# Patient Record
Sex: MALE | Race: WHITE | NOT HISPANIC OR LATINO | Employment: FULL TIME | URBAN - METROPOLITAN AREA
[De-identification: names, ages, dates, MRNs, and addresses within clinical notes are randomized per-mention and may not be internally consistent; named-entity substitution may affect disease eponyms.]

---

## 2023-06-27 LAB
CELLS COUNTED TOTAL (#) IN BODY FLUID: 100
CLARITY FLUID: NORMAL
COLOR OF BODY FLUID: NORMAL
ERYTHROCYTES (/UL) IN BODY FLUID: NORMAL /UL
LEUKOCYTES (/UL) IN BODY FLUID: 930 /UL
LYMPHOCYTES/100 LEUKOCYTES IN BODY FLUID BY MAN CT: 80 %
MONOCYTES+MACROPHAGES/100 WBC IN BODY FLUID BY MAN CT: 4 %
NEUTROPHILS/100 LEUKOCYTES IN BODY FLUID BY MANUAL COUNT: 16 %

## 2023-06-28 LAB — JOINT FLUID CRYSTALS: NORMAL

## 2023-07-01 LAB
GRAM STAIN: NORMAL
STERILE FLUID CULTURE/SMEAR: NORMAL

## 2023-07-05 ENCOUNTER — APPOINTMENT (OUTPATIENT)
Dept: PRIMARY CARE | Facility: CLINIC | Age: 63
End: 2023-07-05
Payer: COMMERCIAL

## 2023-08-15 ENCOUNTER — APPOINTMENT (OUTPATIENT)
Dept: PRIMARY CARE | Facility: CLINIC | Age: 63
End: 2023-08-15
Payer: COMMERCIAL

## 2023-09-16 ASSESSMENT — PROMIS GLOBAL HEALTH SCALE
RATE_PHYSICAL_HEALTH: GOOD
RATE_GENERAL_HEALTH: GOOD
RATE_AVERAGE_PAIN: 5
RATE_QUALITY_OF_LIFE: GOOD
CARRYOUT_SOCIAL_ACTIVITIES: VERY GOOD
RATE_MENTAL_HEALTH: GOOD
EMOTIONAL_PROBLEMS: NEVER
CARRYOUT_PHYSICAL_ACTIVITIES: COMPLETELY
RATE_SOCIAL_SATISFACTION: GOOD

## 2023-09-19 ENCOUNTER — HOSPITAL ENCOUNTER (OUTPATIENT)
Dept: DATA CONVERSION | Facility: HOSPITAL | Age: 63
Discharge: HOME | End: 2023-09-19
Payer: COMMERCIAL

## 2023-09-19 DIAGNOSIS — T84.84XA PAIN DUE TO INTERNAL ORTHOPEDIC PROSTHETIC DEVICES, IMPLANTS AND GRAFTS, INITIAL ENCOUNTER (CMS-HCC): ICD-10-CM

## 2023-09-19 LAB
ABO + RH BLD: NORMAL
ANION GAP SERPL CALCULATED.3IONS-SCNC: 14 MMOL/L (ref 0–19)
ANTICOAGULANT: NORMAL
APTT PPP: 26.5 SEC (ref 22–32.5)
BACTERIA UR QL AUTO: NEGATIVE
BASOPHILS # BLD AUTO: 0.04 K/UL (ref 0–0.22)
BASOPHILS NFR BLD AUTO: 0.4 % (ref 0–1)
BILIRUB UR QL STRIP.AUTO: NEGATIVE
BLD GP AB SCN SERPL QL: NEGATIVE
BLD PROD TYP BPU: NORMAL
BUN SERPL-MCNC: 14 MG/DL (ref 8–25)
BUN/CREAT SERPL: 17.5 RATIO (ref 8–21)
CALCIUM SERPL-MCNC: 9.7 MG/DL (ref 8.5–10.4)
CHLORIDE SERPL-SCNC: 106 MMOL/L (ref 97–107)
CLARITY UR: CLEAR
CO2 SERPL-SCNC: 23 MMOL/L (ref 24–31)
COLOR UR: NORMAL
CREAT SERPL-MCNC: 0.8 MG/DL (ref 0.4–1.6)
DEPRECATED RDW RBC AUTO: 44.6 FL (ref 37–54)
DIFFERENTIAL METHOD BLD: NORMAL
EOSINOPHIL # BLD AUTO: 0.2 K/UL (ref 0–0.45)
EOSINOPHIL NFR BLD: 2 % (ref 0–3)
ERYTHROCYTE [DISTWIDTH] IN BLOOD BY AUTOMATED COUNT: 14.1 % (ref 11.7–15)
GFR SERPL CREATININE-BSD FRML MDRD: 101 ML/MIN/1.73 M2
GLUCOSE SERPL-MCNC: 119 MG/DL (ref 65–99)
GLUCOSE UR STRIP.AUTO-MCNC: NEGATIVE MG/DL
HBA1C MFR BLD: 6.4 % (ref 4–6)
HCT VFR BLD AUTO: 43.6 % (ref 41–50)
HGB BLD-MCNC: 14.2 GM/DL (ref 13.5–16.5)
HGB UR QL STRIP.AUTO: 1 /HPF (ref 0–3)
HGB UR QL: NEGATIVE
HX OF BLOOD TRANSFUSION: NORMAL
HYALINE CASTS UR QL AUTO: NORMAL /LPF
IMM GRANULOCYTES # BLD AUTO: 0.04 K/UL (ref 0–0.1)
INR PPP: 1 (ref 0.86–1.16)
KETONES UR QL STRIP.AUTO: NEGATIVE
LEUKOCYTE ESTERASE UR QL STRIP.AUTO: NEGATIVE
LYMPHOCYTES # BLD AUTO: 2.19 K/UL (ref 1.2–3.2)
LYMPHOCYTES NFR BLD MANUAL: 21.9 % (ref 20–40)
MCH RBC QN AUTO: 28.6 PG (ref 26–34)
MCHC RBC AUTO-ENTMCNC: 32.6 % (ref 31–37)
MCV RBC AUTO: 87.7 FL (ref 80–100)
MICROSCOPIC (UA): NORMAL
MONOCYTES # BLD AUTO: 0.56 K/UL (ref 0–0.8)
MONOCYTES NFR BLD MANUAL: 5.6 % (ref 0–8)
MRSA DNA SPEC QL NAA+PROBE: NEGATIVE
NEUTROPHILS # BLD AUTO: 6.96 K/UL
NEUTROPHILS # BLD AUTO: 6.96 K/UL (ref 1.8–7.7)
NEUTROPHILS.IMMATURE NFR BLD: 0.4 % (ref 0–1)
NEUTS SEG NFR BLD: 69.7 % (ref 50–70)
NITRITE UR QL STRIP.AUTO: NEGATIVE
NRBC BLD-RTO: 0 /100 WBC
NUM BPU REQUESTED: 0
PH UR STRIP.AUTO: 5.5 [PH] (ref 4.6–8)
PLATELET # BLD AUTO: 335 K/UL (ref 150–450)
PMV BLD AUTO: 10.9 CU (ref 7–12.6)
POTASSIUM SERPL-SCNC: 4.4 MMOL/L (ref 3.4–5.1)
PROT UR STRIP.AUTO-MCNC: NEGATIVE MG/DL
PROTHROMBIN TIME: 10.3 SEC (ref 9.3–12.7)
RBC # BLD AUTO: 4.97 M/UL (ref 4.5–5.5)
SODIUM SERPL-SCNC: 143 MMOL/L (ref 133–145)
SP GR UR STRIP.AUTO: 1.02 (ref 1–1.03)
SPECIMEN EXP DATE BLD: NORMAL
SPECIMEN SOURCE: NORMAL
SQUAMOUS UR QL AUTO: NORMAL /HPF
SURGERY DATE: NORMAL
TEST ORDERED: NORMAL
TRANSF BAND NUM PATIENT: NORMAL
URINE CULTURE: NORMAL
UROBILINOGEN UR QL STRIP.AUTO: NORMAL MG/DL (ref 0–1)
WBC # BLD AUTO: 10 K/UL (ref 4.5–11)
WBC #/AREA URNS AUTO: 1 /HPF (ref 0–3)

## 2023-09-20 ENCOUNTER — OFFICE VISIT (OUTPATIENT)
Dept: PRIMARY CARE | Facility: CLINIC | Age: 63
End: 2023-09-20
Payer: COMMERCIAL

## 2023-09-20 VITALS
BODY MASS INDEX: 29.53 KG/M2 | SYSTOLIC BLOOD PRESSURE: 138 MMHG | OXYGEN SATURATION: 95 % | HEIGHT: 72 IN | WEIGHT: 218 LBS | HEART RATE: 68 BPM | DIASTOLIC BLOOD PRESSURE: 70 MMHG

## 2023-09-20 DIAGNOSIS — E78.49 OTHER HYPERLIPIDEMIA: ICD-10-CM

## 2023-09-20 DIAGNOSIS — E11.9 TYPE 2 DIABETES MELLITUS WITHOUT COMPLICATION, WITHOUT LONG-TERM CURRENT USE OF INSULIN (MULTI): ICD-10-CM

## 2023-09-20 DIAGNOSIS — Z76.89 ENCOUNTER TO ESTABLISH CARE: ICD-10-CM

## 2023-09-20 DIAGNOSIS — Z01.818 PRE-OP EVALUATION: ICD-10-CM

## 2023-09-20 DIAGNOSIS — I10 ESSENTIAL HYPERTENSION: Primary | ICD-10-CM

## 2023-09-20 PROBLEM — M17.11 ARTHRITIS OF KNEE, RIGHT: Status: ACTIVE | Noted: 2023-09-20

## 2023-09-20 PROCEDURE — 3078F DIAST BP <80 MM HG: CPT

## 2023-09-20 PROCEDURE — 1036F TOBACCO NON-USER: CPT

## 2023-09-20 PROCEDURE — 3075F SYST BP GE 130 - 139MM HG: CPT

## 2023-09-20 PROCEDURE — 4010F ACE/ARB THERAPY RXD/TAKEN: CPT

## 2023-09-20 PROCEDURE — 99203 OFFICE O/P NEW LOW 30 MIN: CPT

## 2023-09-20 RX ORDER — LISINOPRIL 10 MG/1
10 TABLET ORAL DAILY
Qty: 90 TABLET | Refills: 3 | Status: ON HOLD | OUTPATIENT
Start: 2023-09-20 | End: 2023-10-03 | Stop reason: SDUPTHER

## 2023-09-20 RX ORDER — METFORMIN HYDROCHLORIDE 500 MG/1
500 TABLET, EXTENDED RELEASE ORAL
Qty: 60 TABLET | Refills: 11 | Status: SHIPPED | OUTPATIENT
Start: 2023-09-20 | End: 2024-09-19

## 2023-09-20 RX ORDER — ROSUVASTATIN CALCIUM 10 MG/1
10 TABLET, COATED ORAL DAILY
COMMUNITY
End: 2023-09-20 | Stop reason: SDUPTHER

## 2023-09-20 RX ORDER — ROSUVASTATIN CALCIUM 10 MG/1
10 TABLET, COATED ORAL DAILY
Qty: 90 TABLET | Refills: 3 | Status: SHIPPED | OUTPATIENT
Start: 2023-09-20 | End: 2024-05-22 | Stop reason: SDUPTHER

## 2023-09-20 RX ORDER — METFORMIN HYDROCHLORIDE 500 MG/1
1000 TABLET, EXTENDED RELEASE ORAL
COMMUNITY
End: 2023-09-20 | Stop reason: SDUPTHER

## 2023-09-20 RX ORDER — LISINOPRIL 10 MG/1
10 TABLET ORAL DAILY
COMMUNITY
End: 2023-09-20 | Stop reason: SDUPTHER

## 2023-09-20 ASSESSMENT — ENCOUNTER SYMPTOMS
DIARRHEA: 0
BLOOD IN STOOL: 0
CONSTIPATION: 0
EYE DISCHARGE: 0
DIZZINESS: 0
HYPERACTIVE: 0
JOINT SWELLING: 1
HEADACHES: 0
DIFFICULTY URINATING: 0
BACK PAIN: 0
EYE PAIN: 0
ABDOMINAL PAIN: 0
NUMBNESS: 0
DYSPHORIC MOOD: 0
NERVOUS/ANXIOUS: 0
SORE THROAT: 0
MYALGIAS: 0
VOMITING: 0
WOUND: 0
EYE ITCHING: 0
SINUS PAIN: 0
SHORTNESS OF BREATH: 0
PALPITATIONS: 0
WHEEZING: 0
HEMATURIA: 0
AGITATION: 0
SINUS PRESSURE: 0
WEAKNESS: 0
FREQUENCY: 0
SLEEP DISTURBANCE: 0
RHINORRHEA: 0
SPEECH DIFFICULTY: 0
FEVER: 0
CHEST TIGHTNESS: 0
FATIGUE: 0
VOICE CHANGE: 0
NECK PAIN: 0
COUGH: 0

## 2023-09-20 ASSESSMENT — PATIENT HEALTH QUESTIONNAIRE - PHQ9
SUM OF ALL RESPONSES TO PHQ9 QUESTIONS 1 AND 2: 0
2. FEELING DOWN, DEPRESSED OR HOPELESS: NOT AT ALL
1. LITTLE INTEREST OR PLEASURE IN DOING THINGS: NOT AT ALL

## 2023-09-20 NOTE — PROGRESS NOTES
Subjective   Patient ID: Wesley Guevara is a 63 y.o. male who presents for Pre-op Exam (Pre Op exam for right knee surgery on 10/2/2023, Needing refill on meds).    Subjective  Wesley Guevara is a 63 y.o. male who presents to the office today for a preoperative consultation at the request of surgeon Dr. Griffiths who plans on performing right knee total on October 2. This consultation is requested for the specific conditions prompting preoperative evaluation (i.e. because of potential effect on operative risk): diabetes and htn management. Planned anesthesia: general. The patient has the following known anesthesia issues: none. Patients bleeding risk: no recent abnormal bleeding. Patient does not have objections to receiving blood products if needed.    Predictors of intubation difficulty:   Morbid obesity? no   Anatomically abnormal facies? no   Prominent incisors? no   Receding mandible? no   Short, thick neck? no   Neck range of motion: normal   Dentition: No chipped, loose, or missing teeth.    Assessment/Plan  63 y.o. male with planned surgery as above.    Known risk factors for perioperative complications: Diabetes mellitus    Difficulty with intubation is not anticipated.    Current medications which may produce withdrawal symptoms if withheld perioperatively: none    1. Preoperative workup as follows as ordered by surgeon.  2. Change in medication regimen before surgery: discontinue Metformin 24 hours before surgery and do not take lisinopril morning of.  3. Prophylaxis for cardiac events with perioperative beta-blockers: not indicated.  4. Invasive hemodynamic monitoring perioperatively: not indicated.  5. Deep vein thrombosis prophylaxis postoperatively:regimen to be chosen by surgical team.  6. Surveillance for postoperative MI with ECG immediately postoperatively and on postoperative days 1 and 2 AND troponin levels 24 hours postoperatively and on day 4 or hospital discharge (whichever comes first): not  indicated.  7. Other measures: Preoperative alcohol abstention x 30 days.           Review of Systems   Constitutional:  Negative for fatigue and fever.   HENT:  Negative for congestion, ear discharge, ear pain, nosebleeds, postnasal drip, rhinorrhea, sinus pressure, sinus pain, sore throat, tinnitus and voice change.    Eyes:  Negative for pain, discharge and itching.   Respiratory:  Negative for cough, chest tightness, shortness of breath and wheezing.    Cardiovascular:  Negative for chest pain, palpitations and leg swelling.   Gastrointestinal:  Negative for abdominal pain, blood in stool, constipation, diarrhea and vomiting.   Endocrine: Negative for cold intolerance, heat intolerance and polyuria.   Genitourinary:  Negative for difficulty urinating, frequency, hematuria, penile pain, penile swelling, scrotal swelling, testicular pain and urgency.   Musculoskeletal:  Positive for gait problem and joint swelling. Negative for back pain, myalgias and neck pain.   Skin:  Negative for rash and wound.   Neurological:  Negative for dizziness, speech difficulty, weakness, numbness and headaches.   Psychiatric/Behavioral:  Negative for agitation, dysphoric mood and sleep disturbance. The patient is not nervous/anxious and is not hyperactive.        Objective   /70   Pulse 68   Ht 1.829 m (6')   Wt 98.9 kg (218 lb)   SpO2 95%   BMI 29.57 kg/m²     Physical Exam  Vitals and nursing note reviewed.   Constitutional:       Appearance: Normal appearance.   HENT:      Head: Normocephalic and atraumatic.      Right Ear: Tympanic membrane and external ear normal.      Left Ear: Tympanic membrane and external ear normal.      Nose: Nose normal.      Mouth/Throat:      Mouth: Mucous membranes are moist.      Pharynx: Oropharynx is clear. Uvula midline.   Eyes:      General: Lids are normal.      Extraocular Movements: Extraocular movements intact.      Pupils: Pupils are equal, round, and reactive to light.   Neck:       Thyroid: No thyromegaly or thyroid tenderness.   Cardiovascular:      Rate and Rhythm: Normal rate and regular rhythm.      Heart sounds: Normal heart sounds.   Pulmonary:      Effort: Pulmonary effort is normal.      Breath sounds: Normal breath sounds.   Abdominal:      General: Bowel sounds are normal.      Palpations: Abdomen is soft.      Tenderness: There is no abdominal tenderness. There is no guarding.   Musculoskeletal:         General: No swelling. Normal range of motion.      Cervical back: Normal range of motion.      Right lower leg: No edema.      Left lower leg: No edema.   Lymphadenopathy:      Head:      Right side of head: No submental, submandibular or tonsillar adenopathy.      Left side of head: No submental, submandibular or tonsillar adenopathy.      Cervical: No cervical adenopathy.   Skin:     General: Skin is warm and dry.      Capillary Refill: Capillary refill takes less than 2 seconds.      Coloration: Skin is not jaundiced.      Findings: No lesion or rash.   Neurological:      General: No focal deficit present.      Mental Status: He is alert and oriented to person, place, and time.   Psychiatric:         Mood and Affect: Mood normal.         Behavior: Behavior normal.         Thought Content: Thought content normal.         Judgment: Judgment normal.         Assessment/Plan   Problem List Items Addressed This Visit       Diabetes mellitus, type 2 (CMS/HCC)    Relevant Medications    metFORMIN  mg 24 hr tablet    Essential hypertension - Primary    Relevant Medications    lisinopril 10 mg tablet    Other hyperlipidemia    Relevant Medications    rosuvastatin (Crestor) 10 mg tablet     Other Visit Diagnoses       Encounter to establish care        Pre-op evaluation            Pt is cleared for knee surgery, form sent to Dr. Griffiths.   Followup after surgery for yearly wellness

## 2023-10-01 PROBLEM — T84.018D FAILURE OF TOTAL KNEE REPLACEMENT, SUBSEQUENT ENCOUNTER: Status: ACTIVE | Noted: 2023-10-01

## 2023-10-01 PROBLEM — Z96.659 FAILURE OF TOTAL KNEE REPLACEMENT, SUBSEQUENT ENCOUNTER: Status: ACTIVE | Noted: 2023-10-01

## 2023-10-01 RX ORDER — CEFAZOLIN SODIUM 2 G/100ML
2 INJECTION, SOLUTION INTRAVENOUS ONCE
Status: CANCELLED | OUTPATIENT
Start: 2023-10-01

## 2023-10-02 ENCOUNTER — ANESTHESIA (OUTPATIENT)
Dept: OPERATING ROOM | Facility: HOSPITAL | Age: 63
End: 2023-10-02
Payer: COMMERCIAL

## 2023-10-02 ENCOUNTER — ANESTHESIA EVENT (OUTPATIENT)
Dept: OPERATING ROOM | Facility: HOSPITAL | Age: 63
End: 2023-10-02
Payer: COMMERCIAL

## 2023-10-02 ENCOUNTER — HOSPITAL ENCOUNTER (OUTPATIENT)
Facility: HOSPITAL | Age: 63
Discharge: HOME | End: 2023-10-03
Attending: ORTHOPAEDIC SURGERY | Admitting: ORTHOPAEDIC SURGERY
Payer: COMMERCIAL

## 2023-10-02 DIAGNOSIS — Z96.659 FAILURE OF TOTAL KNEE REPLACEMENT, SUBSEQUENT ENCOUNTER: Primary | ICD-10-CM

## 2023-10-02 DIAGNOSIS — T84.018D FAILURE OF TOTAL KNEE REPLACEMENT, SUBSEQUENT ENCOUNTER: Primary | ICD-10-CM

## 2023-10-02 DIAGNOSIS — T84.82XA: ICD-10-CM

## 2023-10-02 LAB
GLUCOSE BLD MANUAL STRIP-MCNC: 122 MG/DL (ref 74–99)
GLUCOSE BLD MANUAL STRIP-MCNC: 126 MG/DL (ref 74–99)
GLUCOSE BLD MANUAL STRIP-MCNC: 207 MG/DL (ref 74–99)

## 2023-10-02 PROCEDURE — 7100000011 HC EXTENDED STAY RECOVERY HOURLY - NURSING UNIT

## 2023-10-02 PROCEDURE — 2500000004 HC RX 250 GENERAL PHARMACY W/ HCPCS (ALT 636 FOR OP/ED): Performed by: ANESTHESIOLOGIST ASSISTANT

## 2023-10-02 PROCEDURE — 3600000010 HC OR TIME - EACH INCREMENTAL 1 MINUTE - PROCEDURE LEVEL FIVE: Performed by: ORTHOPAEDIC SURGERY

## 2023-10-02 PROCEDURE — 2780000003 HC OR 278 NO HCPCS: Performed by: ORTHOPAEDIC SURGERY

## 2023-10-02 PROCEDURE — 2720000007 HC OR 272 NO HCPCS: Performed by: ORTHOPAEDIC SURGERY

## 2023-10-02 PROCEDURE — 2500000001 HC RX 250 WO HCPCS SELF ADMINISTERED DRUGS (ALT 637 FOR MEDICARE OP): Performed by: ORTHOPAEDIC SURGERY

## 2023-10-02 PROCEDURE — 82947 ASSAY GLUCOSE BLOOD QUANT: CPT | Mod: 59

## 2023-10-02 PROCEDURE — 2500000004 HC RX 250 GENERAL PHARMACY W/ HCPCS (ALT 636 FOR OP/ED): Performed by: ORTHOPAEDIC SURGERY

## 2023-10-02 PROCEDURE — 3600000005 HC OR TIME - INITIAL BASE CHARGE - PROCEDURE LEVEL FIVE: Performed by: ORTHOPAEDIC SURGERY

## 2023-10-02 PROCEDURE — 3700000002 HC GENERAL ANESTHESIA TIME - EACH INCREMENTAL 1 MINUTE: Performed by: ORTHOPAEDIC SURGERY

## 2023-10-02 PROCEDURE — A27487 PR REVISE KNEE JOINT REPLACE,ALL PARTS: Performed by: ANESTHESIOLOGY

## 2023-10-02 PROCEDURE — 87070 CULTURE OTHR SPECIMN AEROBIC: CPT | Mod: CMCLAB,TRILAB | Performed by: ORTHOPAEDIC SURGERY

## 2023-10-02 PROCEDURE — 2500000005 HC RX 250 GENERAL PHARMACY W/O HCPCS: Performed by: ORTHOPAEDIC SURGERY

## 2023-10-02 PROCEDURE — 2500000004 HC RX 250 GENERAL PHARMACY W/ HCPCS (ALT 636 FOR OP/ED): Performed by: ANESTHESIOLOGY

## 2023-10-02 PROCEDURE — 7100000002 HC RECOVERY ROOM TIME - EACH INCREMENTAL 1 MINUTE: Performed by: ORTHOPAEDIC SURGERY

## 2023-10-02 PROCEDURE — 2500000005 HC RX 250 GENERAL PHARMACY W/O HCPCS: Performed by: ANESTHESIOLOGIST ASSISTANT

## 2023-10-02 PROCEDURE — A27487 PR REVISE KNEE JOINT REPLACE,ALL PARTS: Performed by: ANESTHESIOLOGIST ASSISTANT

## 2023-10-02 PROCEDURE — 82947 ASSAY GLUCOSE BLOOD QUANT: CPT | Mod: 91

## 2023-10-02 PROCEDURE — 3700000001 HC GENERAL ANESTHESIA TIME - INITIAL BASE CHARGE: Performed by: ORTHOPAEDIC SURGERY

## 2023-10-02 PROCEDURE — 2580000001 HC RX 258 IV SOLUTIONS: Performed by: ANESTHESIOLOGY

## 2023-10-02 PROCEDURE — 7100000001 HC RECOVERY ROOM TIME - INITIAL BASE CHARGE: Performed by: ORTHOPAEDIC SURGERY

## 2023-10-02 PROCEDURE — 2500000004 HC RX 250 GENERAL PHARMACY W/ HCPCS (ALT 636 FOR OP/ED)

## 2023-10-02 PROCEDURE — C1776 JOINT DEVICE (IMPLANTABLE): HCPCS | Performed by: ORTHOPAEDIC SURGERY

## 2023-10-02 DEVICE — ATTUNE KNEE SYSTEM TIBIAL INSERT ROTATING PLATFORM POSTERIOR STABILIZED SIZE 8 12MM AOX
Type: IMPLANTABLE DEVICE | Site: KNEE | Status: FUNCTIONAL
Brand: ATTUNE

## 2023-10-02 RX ORDER — DIPHENHYDRAMINE HCL 25 MG
12.5 TABLET ORAL EVERY 6 HOURS PRN
Status: DISCONTINUED | OUTPATIENT
Start: 2023-10-02 | End: 2023-10-03 | Stop reason: HOSPADM

## 2023-10-02 RX ORDER — KETOROLAC TROMETHAMINE 30 MG/ML
15 INJECTION, SOLUTION INTRAMUSCULAR; INTRAVENOUS EVERY 6 HOURS
Status: DISCONTINUED | OUTPATIENT
Start: 2023-10-02 | End: 2023-10-02

## 2023-10-02 RX ORDER — BUPIVACAINE HCL/EPINEPHRINE 0.5-1:200K
VIAL (ML) INJECTION AS NEEDED
Status: DISCONTINUED | OUTPATIENT
Start: 2023-10-02 | End: 2023-10-02 | Stop reason: HOSPADM

## 2023-10-02 RX ORDER — VANCOMYCIN HYDROCHLORIDE 1 G/20ML
INJECTION, POWDER, LYOPHILIZED, FOR SOLUTION INTRAVENOUS AS NEEDED
Status: DISCONTINUED | OUTPATIENT
Start: 2023-10-02 | End: 2023-10-02 | Stop reason: HOSPADM

## 2023-10-02 RX ORDER — ACETAMINOPHEN 325 MG/1
650 TABLET ORAL EVERY 6 HOURS SCHEDULED
Status: DISCONTINUED | OUTPATIENT
Start: 2023-10-02 | End: 2023-10-03 | Stop reason: HOSPADM

## 2023-10-02 RX ORDER — CEFAZOLIN SODIUM 2 G/100ML
2 INJECTION, SOLUTION INTRAVENOUS EVERY 8 HOURS
Status: CANCELLED | OUTPATIENT
Start: 2023-10-03 | End: 2023-10-04

## 2023-10-02 RX ORDER — CEFAZOLIN SODIUM 2 G/100ML
2 INJECTION, SOLUTION INTRAVENOUS ONCE
Status: COMPLETED | OUTPATIENT
Start: 2023-10-02 | End: 2023-10-02

## 2023-10-02 RX ORDER — SODIUM CHLORIDE 9 MG/ML
100 INJECTION, SOLUTION INTRAVENOUS CONTINUOUS
Status: DISCONTINUED | OUTPATIENT
Start: 2023-10-02 | End: 2023-10-03 | Stop reason: HOSPADM

## 2023-10-02 RX ORDER — CEFAZOLIN 1 G/1
INJECTION, POWDER, FOR SOLUTION INTRAVENOUS AS NEEDED
Status: DISCONTINUED | OUTPATIENT
Start: 2023-10-02 | End: 2023-10-02

## 2023-10-02 RX ORDER — LIDOCAINE HYDROCHLORIDE 20 MG/ML
INJECTION, SOLUTION INFILTRATION; PERINEURAL AS NEEDED
Status: DISCONTINUED | OUTPATIENT
Start: 2023-10-02 | End: 2023-10-02

## 2023-10-02 RX ORDER — ONDANSETRON HYDROCHLORIDE 2 MG/ML
INJECTION, SOLUTION INTRAVENOUS AS NEEDED
Status: DISCONTINUED | OUTPATIENT
Start: 2023-10-02 | End: 2023-10-02

## 2023-10-02 RX ORDER — NALOXONE HYDROCHLORIDE 0.4 MG/ML
0.2 INJECTION, SOLUTION INTRAMUSCULAR; INTRAVENOUS; SUBCUTANEOUS EVERY 5 MIN PRN
Status: DISCONTINUED | OUTPATIENT
Start: 2023-10-02 | End: 2023-10-03 | Stop reason: HOSPADM

## 2023-10-02 RX ORDER — PROPOFOL 10 MG/ML
INJECTION, EMULSION INTRAVENOUS AS NEEDED
Status: DISCONTINUED | OUTPATIENT
Start: 2023-10-02 | End: 2023-10-02

## 2023-10-02 RX ORDER — MIDAZOLAM HYDROCHLORIDE 1 MG/ML
2 INJECTION INTRAMUSCULAR; INTRAVENOUS ONCE
Status: COMPLETED | OUTPATIENT
Start: 2023-10-02 | End: 2023-10-02

## 2023-10-02 RX ORDER — HYDROCODONE BITARTRATE AND ACETAMINOPHEN 5; 325 MG/1; MG/1
2 TABLET ORAL EVERY 4 HOURS PRN
Status: DISCONTINUED | OUTPATIENT
Start: 2023-10-02 | End: 2023-10-03 | Stop reason: HOSPADM

## 2023-10-02 RX ORDER — LIDOCAINE HYDROCHLORIDE 10 MG/ML
0.1 INJECTION, SOLUTION EPIDURAL; INFILTRATION; INTRACAUDAL; PERINEURAL ONCE
Status: DISCONTINUED | OUTPATIENT
Start: 2023-10-02 | End: 2023-10-03 | Stop reason: HOSPADM

## 2023-10-02 RX ORDER — ASPIRIN 81 MG/1
81 TABLET ORAL 2 TIMES DAILY
Status: DISCONTINUED | OUTPATIENT
Start: 2023-10-02 | End: 2023-10-03 | Stop reason: HOSPADM

## 2023-10-02 RX ORDER — MORPHINE SULFATE 2 MG/ML
INJECTION, SOLUTION INTRAMUSCULAR; INTRAVENOUS AS NEEDED
Status: DISCONTINUED | OUTPATIENT
Start: 2023-10-02 | End: 2023-10-02 | Stop reason: HOSPADM

## 2023-10-02 RX ORDER — TRANEXAMIC ACID 100 MG/ML
INJECTION, SOLUTION INTRAVENOUS AS NEEDED
Status: DISCONTINUED | OUTPATIENT
Start: 2023-10-02 | End: 2023-10-02

## 2023-10-02 RX ORDER — DOCUSATE SODIUM 100 MG/1
100 CAPSULE, LIQUID FILLED ORAL 2 TIMES DAILY
Status: DISCONTINUED | OUTPATIENT
Start: 2023-10-02 | End: 2023-10-03 | Stop reason: HOSPADM

## 2023-10-02 RX ORDER — NALOXONE HYDROCHLORIDE 0.4 MG/ML
0.2 INJECTION, SOLUTION INTRAMUSCULAR; INTRAVENOUS; SUBCUTANEOUS EVERY 5 MIN PRN
Status: DISCONTINUED | OUTPATIENT
Start: 2023-10-02 | End: 2023-10-02

## 2023-10-02 RX ORDER — FERROUS SULFATE 325(65) MG
65 TABLET ORAL
Status: DISCONTINUED | OUTPATIENT
Start: 2023-10-02 | End: 2023-10-03 | Stop reason: HOSPADM

## 2023-10-02 RX ORDER — SODIUM CHLORIDE, SODIUM LACTATE, POTASSIUM CHLORIDE, CALCIUM CHLORIDE 600; 310; 30; 20 MG/100ML; MG/100ML; MG/100ML; MG/100ML
50 INJECTION, SOLUTION INTRAVENOUS CONTINUOUS
Status: DISCONTINUED | OUTPATIENT
Start: 2023-10-02 | End: 2023-10-03

## 2023-10-02 RX ORDER — PHENYLEPHRINE HCL IN 0.9% NACL 1 MG/10 ML
SYRINGE (ML) INTRAVENOUS AS NEEDED
Status: DISCONTINUED | OUTPATIENT
Start: 2023-10-02 | End: 2023-10-02

## 2023-10-02 RX ORDER — KETOROLAC TROMETHAMINE 30 MG/ML
15 INJECTION, SOLUTION INTRAMUSCULAR; INTRAVENOUS EVERY 6 HOURS PRN
Status: DISCONTINUED | OUTPATIENT
Start: 2023-10-02 | End: 2023-10-03 | Stop reason: HOSPADM

## 2023-10-02 RX ORDER — HYDROMORPHONE HYDROCHLORIDE 1 MG/ML
1 INJECTION, SOLUTION INTRAMUSCULAR; INTRAVENOUS; SUBCUTANEOUS EVERY 5 MIN PRN
Status: DISCONTINUED | OUTPATIENT
Start: 2023-10-02 | End: 2023-10-03 | Stop reason: HOSPADM

## 2023-10-02 RX ORDER — CEFAZOLIN SODIUM 2 G/100ML
2 INJECTION, SOLUTION INTRAVENOUS EVERY 8 HOURS
Status: COMPLETED | OUTPATIENT
Start: 2023-10-02 | End: 2023-10-03

## 2023-10-02 RX ORDER — HYDROCODONE BITARTRATE AND ACETAMINOPHEN 5; 325 MG/1; MG/1
1 TABLET ORAL EVERY 4 HOURS PRN
Status: DISCONTINUED | OUTPATIENT
Start: 2023-10-02 | End: 2023-10-03 | Stop reason: HOSPADM

## 2023-10-02 RX ORDER — SODIUM CHLORIDE, SODIUM LACTATE, POTASSIUM CHLORIDE, CALCIUM CHLORIDE 600; 310; 30; 20 MG/100ML; MG/100ML; MG/100ML; MG/100ML
100 INJECTION, SOLUTION INTRAVENOUS CONTINUOUS
Status: DISCONTINUED | OUTPATIENT
Start: 2023-10-02 | End: 2023-10-03 | Stop reason: HOSPADM

## 2023-10-02 RX ADMIN — ONDANSETRON HYDROCHLORIDE 4 MG: 2 INJECTION INTRAMUSCULAR; INTRAVENOUS at 13:16

## 2023-10-02 RX ADMIN — MIDAZOLAM HYDROCHLORIDE 2 MG: 1 INJECTION, SOLUTION INTRAMUSCULAR; INTRAVENOUS at 12:53

## 2023-10-02 RX ADMIN — PROPOFOL 100 MG: 10 INJECTION, EMULSION INTRAVENOUS at 13:22

## 2023-10-02 RX ADMIN — FENTANYL CITRATE 50 MCG: 50 INJECTION, SOLUTION INTRAMUSCULAR; INTRAVENOUS at 14:30

## 2023-10-02 RX ADMIN — Medication 100 MCG: at 14:18

## 2023-10-02 RX ADMIN — SODIUM CHLORIDE, POTASSIUM CHLORIDE, SODIUM LACTATE AND CALCIUM CHLORIDE: 600; 310; 30; 20 INJECTION, SOLUTION INTRAVENOUS at 13:53

## 2023-10-02 RX ADMIN — CEFAZOLIN SODIUM 2 G: 2 INJECTION, SOLUTION INTRAVENOUS at 13:13

## 2023-10-02 RX ADMIN — ACETAMINOPHEN 650 MG: 325 TABLET, FILM COATED ORAL at 18:29

## 2023-10-02 RX ADMIN — FENTANYL CITRATE 50 MCG: 50 INJECTION, SOLUTION INTRAMUSCULAR; INTRAVENOUS at 12:53

## 2023-10-02 RX ADMIN — SODIUM CHLORIDE, POTASSIUM CHLORIDE, SODIUM LACTATE AND CALCIUM CHLORIDE: 600; 310; 30; 20 INJECTION, SOLUTION INTRAVENOUS at 13:05

## 2023-10-02 RX ADMIN — HYDROMORPHONE HYDROCHLORIDE 0.2 MG: 1 INJECTION, SOLUTION INTRAMUSCULAR; INTRAVENOUS; SUBCUTANEOUS at 15:13

## 2023-10-02 RX ADMIN — SODIUM CHLORIDE 100 ML/HR: 9 INJECTION, SOLUTION INTRAVENOUS at 17:45

## 2023-10-02 RX ADMIN — FENTANYL CITRATE 50 MCG: 50 INJECTION, SOLUTION INTRAMUSCULAR; INTRAVENOUS at 13:10

## 2023-10-02 RX ADMIN — Medication 100 MCG: at 14:12

## 2023-10-02 RX ADMIN — DOCUSATE SODIUM 100 MG: 100 CAPSULE, LIQUID FILLED ORAL at 20:49

## 2023-10-02 RX ADMIN — Medication 100 MCG: at 13:40

## 2023-10-02 RX ADMIN — HYDROMORPHONE HYDROCHLORIDE 0.2 MG: 1 INJECTION, SOLUTION INTRAMUSCULAR; INTRAVENOUS; SUBCUTANEOUS at 15:01

## 2023-10-02 RX ADMIN — DEXAMETHASONE SODIUM PHOSPHATE 4 MG: 4 INJECTION, SOLUTION INTRAMUSCULAR; INTRAVENOUS at 13:16

## 2023-10-02 RX ADMIN — LIDOCAINE HYDROCHLORIDE 50 MG: 20 INJECTION, SOLUTION INFILTRATION; PERINEURAL at 13:10

## 2023-10-02 RX ADMIN — CEFAZOLIN SODIUM 2 G: 2 INJECTION, SOLUTION INTRAVENOUS at 18:29

## 2023-10-02 RX ADMIN — HYDROMORPHONE HYDROCHLORIDE 0.2 MG: 1 INJECTION, SOLUTION INTRAMUSCULAR; INTRAVENOUS; SUBCUTANEOUS at 15:06

## 2023-10-02 RX ADMIN — TRANEXAMIC ACID 2 G: 100 INJECTION, SOLUTION INTRAVENOUS at 13:15

## 2023-10-02 RX ADMIN — PROPOFOL 200 MG: 10 INJECTION, EMULSION INTRAVENOUS at 13:10

## 2023-10-02 RX ADMIN — Medication 100 MCG: at 13:52

## 2023-10-02 RX ADMIN — ASPIRIN 81 MG: 81 TABLET, COATED ORAL at 20:49

## 2023-10-02 RX ADMIN — Medication 100 MCG: at 13:30

## 2023-10-02 RX ADMIN — HYDROMORPHONE HYDROCHLORIDE 0.2 MG: 1 INJECTION, SOLUTION INTRAMUSCULAR; INTRAVENOUS; SUBCUTANEOUS at 15:26

## 2023-10-02 RX ADMIN — PROPOFOL 100 MG: 10 INJECTION, EMULSION INTRAVENOUS at 13:12

## 2023-10-02 RX ADMIN — Medication 100 MCG: at 14:01

## 2023-10-02 SDOH — SOCIAL STABILITY: SOCIAL NETWORK: HOW OFTEN DO YOU ATTENT MEETINGS OF THE CLUB OR ORGANIZATION YOU BELONG TO?: 1 TO 4 TIMES PER YEAR

## 2023-10-02 SDOH — ECONOMIC STABILITY: FOOD INSECURITY: WITHIN THE PAST 12 MONTHS, THE FOOD YOU BOUGHT JUST DIDN'T LAST AND YOU DIDN'T HAVE MONEY TO GET MORE.: NEVER TRUE

## 2023-10-02 SDOH — SOCIAL STABILITY: SOCIAL INSECURITY: WERE YOU ABLE TO COMPLETE ALL THE BEHAVIORAL HEALTH SCREENINGS?: YES

## 2023-10-02 SDOH — ECONOMIC STABILITY: FOOD INSECURITY: WITHIN THE PAST 12 MONTHS, YOU WORRIED THAT YOUR FOOD WOULD RUN OUT BEFORE YOU GOT MONEY TO BUY MORE.: NEVER TRUE

## 2023-10-02 SDOH — SOCIAL STABILITY: SOCIAL INSECURITY
WITHIN THE LAST YEAR, HAVE TO BEEN RAPED OR FORCED TO HAVE ANY KIND OF SEXUAL ACTIVITY BY YOUR PARTNER OR EX-PARTNER?: NO

## 2023-10-02 SDOH — SOCIAL STABILITY: SOCIAL INSECURITY: ARE THERE ANY APPARENT SIGNS OF INJURIES/BEHAVIORS THAT COULD BE RELATED TO ABUSE/NEGLECT?: NO

## 2023-10-02 SDOH — SOCIAL STABILITY: SOCIAL NETWORK
DO YOU BELONG TO ANY CLUBS OR ORGANIZATIONS SUCH AS CHURCH GROUPS UNIONS, FRATERNAL OR ATHLETIC GROUPS, OR SCHOOL GROUPS?: NO

## 2023-10-02 SDOH — SOCIAL STABILITY: SOCIAL INSECURITY: ARE YOU OR HAVE YOU BEEN THREATENED OR ABUSED PHYSICALLY, EMOTIONALLY, OR SEXUALLY BY ANYONE?: NO

## 2023-10-02 SDOH — SOCIAL STABILITY: SOCIAL INSECURITY: DOES ANYONE TRY TO KEEP YOU FROM HAVING/CONTACTING OTHER FRIENDS OR DOING THINGS OUTSIDE YOUR HOME?: NO

## 2023-10-02 SDOH — SOCIAL STABILITY: SOCIAL INSECURITY: ABUSE: ADULT

## 2023-10-02 SDOH — SOCIAL STABILITY: SOCIAL INSECURITY: DO YOU FEEL ANYONE HAS EXPLOITED OR TAKEN ADVANTAGE OF YOU FINANCIALLY OR OF YOUR PERSONAL PROPERTY?: NO

## 2023-10-02 SDOH — SOCIAL STABILITY: SOCIAL NETWORK: HOW OFTEN DO YOU GET TOGETHER WITH FRIENDS OR RELATIVES?: TWICE A WEEK

## 2023-10-02 SDOH — SOCIAL STABILITY: SOCIAL INSECURITY: HAS ANYONE EVER THREATENED TO HURT YOUR FAMILY OR YOUR PETS?: NO

## 2023-10-02 SDOH — SOCIAL STABILITY: SOCIAL INSECURITY
WITHIN THE LAST YEAR, HAVE YOU BEEN KICKED, HIT, SLAPPED, OR OTHERWISE PHYSICALLY HURT BY YOUR PARTNER OR EX-PARTNER?: NO

## 2023-10-02 SDOH — HEALTH STABILITY: MENTAL HEALTH
STRESS IS WHEN SOMEONE FEELS TENSE, NERVOUS, ANXIOUS, OR CAN'T SLEEP AT NIGHT BECAUSE THEIR MIND IS TROUBLED. HOW STRESSED ARE YOU?: NOT AT ALL

## 2023-10-02 SDOH — ECONOMIC STABILITY: HOUSING INSECURITY: IN THE LAST 12 MONTHS, HOW MANY PLACES HAVE YOU LIVED?: 1

## 2023-10-02 SDOH — SOCIAL STABILITY: SOCIAL INSECURITY: WITHIN THE LAST YEAR, HAVE YOU BEEN HUMILIATED OR EMOTIONALLY ABUSED IN OTHER WAYS BY YOUR PARTNER OR EX-PARTNER?: NO

## 2023-10-02 SDOH — SOCIAL STABILITY: SOCIAL NETWORK: HOW OFTEN DO YOU ATTEND CHURCH OR RELIGIOUS SERVICES?: NEVER

## 2023-10-02 SDOH — SOCIAL STABILITY: SOCIAL INSECURITY: HAVE YOU HAD THOUGHTS OF HARMING ANYONE ELSE?: NO

## 2023-10-02 SDOH — SOCIAL STABILITY: SOCIAL INSECURITY: DO YOU FEEL UNSAFE GOING BACK TO THE PLACE WHERE YOU ARE LIVING?: NO

## 2023-10-02 SDOH — ECONOMIC STABILITY: INCOME INSECURITY: IN THE PAST 12 MONTHS, HAS THE ELECTRIC, GAS, OIL, OR WATER COMPANY THREATENED TO SHUT OFF SERVICE IN YOUR HOME?: NO

## 2023-10-02 SDOH — HEALTH STABILITY: MENTAL HEALTH: CURRENT SMOKER: 0

## 2023-10-02 SDOH — ECONOMIC STABILITY: INCOME INSECURITY: IN THE LAST 12 MONTHS, WAS THERE A TIME WHEN YOU WERE NOT ABLE TO PAY THE MORTGAGE OR RENT ON TIME?: NO

## 2023-10-02 SDOH — SOCIAL STABILITY: SOCIAL NETWORK: ARE YOU MARRIED, WIDOWED, DIVORCED, SEPARATED, NEVER MARRIED, OR LIVING WITH A PARTNER?: MARRIED

## 2023-10-02 SDOH — ECONOMIC STABILITY: HOUSING INSECURITY
IN THE LAST 12 MONTHS, WAS THERE A TIME WHEN YOU DID NOT HAVE A STEADY PLACE TO SLEEP OR SLEPT IN A SHELTER (INCLUDING NOW)?: NO

## 2023-10-02 SDOH — SOCIAL STABILITY: SOCIAL INSECURITY: WERE YOU ABLE TO COMPLETE ALL THE BEHAVIORAL HEALTH SCREENINGS?: NO

## 2023-10-02 SDOH — HEALTH STABILITY: PHYSICAL HEALTH: ON AVERAGE, HOW MANY MINUTES DO YOU ENGAGE IN EXERCISE AT THIS LEVEL?: 0 MIN

## 2023-10-02 SDOH — SOCIAL STABILITY: SOCIAL INSECURITY: WITHIN THE LAST YEAR, HAVE YOU BEEN AFRAID OF YOUR PARTNER OR EX-PARTNER?: NO

## 2023-10-02 SDOH — HEALTH STABILITY: PHYSICAL HEALTH: ON AVERAGE, HOW MANY DAYS PER WEEK DO YOU ENGAGE IN MODERATE TO STRENUOUS EXERCISE (LIKE A BRISK WALK)?: 0 DAYS

## 2023-10-02 SDOH — SOCIAL STABILITY: SOCIAL NETWORK: IN A TYPICAL WEEK, HOW MANY TIMES DO YOU TALK ON THE PHONE WITH FAMILY, FRIENDS, OR NEIGHBORS?: TWICE A WEEK

## 2023-10-02 SDOH — ECONOMIC STABILITY: TRANSPORTATION INSECURITY
IN THE PAST 12 MONTHS, HAS LACK OF TRANSPORTATION KEPT YOU FROM MEETINGS, WORK, OR FROM GETTING THINGS NEEDED FOR DAILY LIVING?: NO

## 2023-10-02 SDOH — ECONOMIC STABILITY: INCOME INSECURITY: HOW HARD IS IT FOR YOU TO PAY FOR THE VERY BASICS LIKE FOOD, HOUSING, MEDICAL CARE, AND HEATING?: NOT HARD AT ALL

## 2023-10-02 SDOH — ECONOMIC STABILITY: TRANSPORTATION INSECURITY
IN THE PAST 12 MONTHS, HAS THE LACK OF TRANSPORTATION KEPT YOU FROM MEDICAL APPOINTMENTS OR FROM GETTING MEDICATIONS?: NO

## 2023-10-02 ASSESSMENT — LIFESTYLE VARIABLES
HOW OFTEN DO YOU HAVE A DRINK CONTAINING ALCOHOL: NEVER
HOW MANY STANDARD DRINKS CONTAINING ALCOHOL DO YOU HAVE ON A TYPICAL DAY: PATIENT DOES NOT DRINK
HOW OFTEN DO YOU HAVE 6 OR MORE DRINKS ON ONE OCCASION: NEVER
HOW MANY STANDARD DRINKS CONTAINING ALCOHOL DO YOU HAVE ON A TYPICAL DAY: PATIENT DOES NOT DRINK
AUDIT-C TOTAL SCORE: 0
AUDIT-C TOTAL SCORE: 0
SKIP TO QUESTIONS 9-10: 1
AUDIT-C TOTAL SCORE: 0
SKIP TO QUESTIONS 9-10: 1
HOW OFTEN DO YOU HAVE 6 OR MORE DRINKS ON ONE OCCASION: NEVER
SUBSTANCE_ABUSE_PAST_12_MONTHS: NO
SUBSTANCE_ABUSE_PAST_12_MONTHS: NO
PRESCIPTION_ABUSE_PAST_12_MONTHS: NO
AUDIT-C TOTAL SCORE: 0
HOW OFTEN DO YOU HAVE A DRINK CONTAINING ALCOHOL: NEVER
PRESCIPTION_ABUSE_PAST_12_MONTHS: NO

## 2023-10-02 ASSESSMENT — PAIN SCALES - GENERAL
PAINLEVEL_OUTOF10: 7
PAIN_LEVEL: 0
PAINLEVEL_OUTOF10: 4
PAINLEVEL_OUTOF10: 3
PAINLEVEL_OUTOF10: 4
PAINLEVEL_OUTOF10: 3
PAINLEVEL_OUTOF10: 4
PAINLEVEL_OUTOF10: 3
PAINLEVEL_OUTOF10: 3
PAINLEVEL_OUTOF10: 4

## 2023-10-02 ASSESSMENT — COGNITIVE AND FUNCTIONAL STATUS - GENERAL
DAILY ACTIVITIY SCORE: 24
MOBILITY SCORE: 24
MOBILITY SCORE: 24
PATIENT BASELINE BEDBOUND: NO
DAILY ACTIVITIY SCORE: 24

## 2023-10-02 ASSESSMENT — PATIENT HEALTH QUESTIONNAIRE - PHQ9
2. FEELING DOWN, DEPRESSED OR HOPELESS: NOT AT ALL
SUM OF ALL RESPONSES TO PHQ9 QUESTIONS 1 & 2: 0
1. LITTLE INTEREST OR PLEASURE IN DOING THINGS: NOT AT ALL
1. LITTLE INTEREST OR PLEASURE IN DOING THINGS: NOT AT ALL
SUM OF ALL RESPONSES TO PHQ9 QUESTIONS 1 & 2: 0
2. FEELING DOWN, DEPRESSED OR HOPELESS: NOT AT ALL

## 2023-10-02 ASSESSMENT — ACTIVITIES OF DAILY LIVING (ADL)
ADEQUATE_TO_COMPLETE_ADL: NO
GROOMING: INDEPENDENT
FEEDING YOURSELF: INDEPENDENT
JUDGMENT_ADEQUATE_SAFELY_COMPLETE_DAILY_ACTIVITIES: NO
HEARING - LEFT EAR: FUNCTIONAL
BATHING: INDEPENDENT
TOILETING: INDEPENDENT
HEARING - RIGHT EAR: FUNCTIONAL
WALKS IN HOME: INDEPENDENT
DRESSING YOURSELF: INDEPENDENT
PATIENT'S MEMORY ADEQUATE TO SAFELY COMPLETE DAILY ACTIVITIES?: NO

## 2023-10-02 ASSESSMENT — COLUMBIA-SUICIDE SEVERITY RATING SCALE - C-SSRS
2. HAVE YOU ACTUALLY HAD ANY THOUGHTS OF KILLING YOURSELF?: NO
6. HAVE YOU EVER DONE ANYTHING, STARTED TO DO ANYTHING, OR PREPARED TO DO ANYTHING TO END YOUR LIFE?: NO
4. HAVE YOU HAD THESE THOUGHTS AND HAD SOME INTENTION OF ACTING ON THEM?: NO
5. HAVE YOU STARTED TO WORK OUT OR WORKED OUT THE DETAILS OF HOW TO KILL YOURSELF? DO YOU INTEND TO CARRY OUT THIS PLAN?: NO
1. IN THE PAST MONTH, HAVE YOU WISHED YOU WERE DEAD OR WISHED YOU COULD GO TO SLEEP AND NOT WAKE UP?: YES

## 2023-10-02 ASSESSMENT — PAIN - FUNCTIONAL ASSESSMENT
PAIN_FUNCTIONAL_ASSESSMENT: 0-10
PAIN_FUNCTIONAL_ASSESSMENT: 0-10

## 2023-10-02 ASSESSMENT — PAIN DESCRIPTION - DESCRIPTORS: DESCRIPTORS: ACHING

## 2023-10-02 NOTE — ANESTHESIA POSTPROCEDURE EVALUATION
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, implants and grafts, initial encounter (CMS/[facility]) [T84.82XA])    Surgeons: Nasim Griffiths MD Responsible Provider: BYRON June    Anesthesia Type: general ASA Status: 2            Anesthesia Type: general    Vitals Value Taken Time   /79 10/02/23 1458   Temp 36.3 10/02/23 1458   Pulse 67 10/02/23 1458   Resp 15 10/02/23 1458   SpO2 100 10/02/23 1458       Anesthesia Post Evaluation    Patient location during evaluation: PACU  Patient participation: complete - patient participated  Level of consciousness: awake  Pain score: 0  Pain management: adequate  Multimodal analgesia pain management approach  Airway patency: patent  Two or more strategies used to mitigate risk of obstructive sleep apnea  Cardiovascular status: acceptable  Respiratory status: acceptable  Hydration status: acceptable      No notable events documented.

## 2023-10-02 NOTE — ANESTHESIA PROCEDURE NOTES
Airway  Date/Time: 10/2/2023 1:12 PM  Urgency: elective    Airway not difficult    Staffing  Performed: BYRON   Authorized by: Jared Jackson MD    Performed by: BYRON June  Patient location during procedure: OR    Indications and Patient Condition  Indications for airway management: anesthesia      Final Airway Details  Final airway type: supraglottic airway      Successful airway: classic  Size 5     Number of attempts at approach: 2 (First by ARMAAN)  Number of other approaches attempted: 0

## 2023-10-02 NOTE — CARE PLAN
The patient's goals for the shift include  pain management    The clinical goals for the shift include pain management

## 2023-10-02 NOTE — ANESTHESIA PREPROCEDURE EVALUATION
Patient: Wesley Guevara    Procedure Information       Date/Time: 10/02/23 1030    Procedure: REVISION RIGHT TOTAL KNEE ARTHROPLASTY (DEPUY ATTUNE POLYS TC3 RP STEMS, SLEEVES) (Right: Knee)    Location: [facility] / [facility]    Surgeons: Nasim Griffiths MD            Relevant Problems   Cardiovascular   (+) Essential hypertension   (+) Other hyperlipidemia      Endocrine   (+) Diabetes mellitus, type 2 (CMS/HCC)      Other   (+) Arthritis of knee, right       Clinical information reviewed:    Allergies  Meds               NPO Detail:  NPO/Void Status  Date of Last Liquid: 10/01/23  Time of Last Liquid: 2200  Date of Last Solid: 10/01/23  Time of Last Solid: 2200  Time of Last Void: 0600         Physical Exam    Airway  Mallampati: I  TM distance: >3 FB  Neck ROM: full     Cardiovascular - normal exam  Rhythm: regular  Rate: normal     Dental    Pulmonary - normal exam     Abdominal - normal exam             Anesthesia Plan    ASA 2     general     The patient is not a current smoker.  Patient was not previously instructed to abstain from smoking on day of procedure.  Patient did not smoke on day of procedure.    intravenous induction   Postoperative administration of opioids is intended.  Anesthetic plan and risks discussed with patient and spouse.  Use of blood products discussed with patient and spouse who consented to blood products.    Plan discussed with [facility].

## 2023-10-02 NOTE — ANESTHESIA PROCEDURE NOTES
Peripheral Block    Patient location during procedure: post-op  Start time: 10/2/2023 12:55 PM  End time: 10/2/2023 12:56 PM  Reason for block: post-op pain management  Staffing  Performed: attending   Authorized by: Jared Jackson MD    Performed by: Jared Jackson MD  Preanesthetic Checklist  Completed: patient identified, IV checked, site marked, risks and benefits discussed, surgical consent, monitors and equipment checked, pre-op evaluation and timeout performed   Timeout performed at: 10/2/2023 12:52 PM  Peripheral Block  Patient position: laying flat  Prep: alcohol swabs, ChloraPrep and site prepped and draped  Patient monitoring: heart rate, cardiac monitor and continuous pulse ox  Block type: adductor canal  Injection technique: single-shot  Guidance: nerve stimulator and ultrasound guided  Local infiltration: lidocaine  Infiltration strength: 1 %  Dose: 3 mL  Needle  Needle gauge: 22 G  Needle length: 5 cm  Needle localization: ultrasound guidance     image stored in chart  Needle insertion depth: 5 cm  Test dose: negative  Assessment  Injection assessment: negative aspiration for heme, no paresthesia on injection, local visualized surrounding nerve on ultrasound and incremental injection  Paresthesia pain: immediately resolved  Heart rate change: no  Slow fractionated injection: no  Additional Notes  Blocked with bupivacaine 0.5% x 20mL with epi 1:400K

## 2023-10-02 NOTE — OP NOTE
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    Procedure Summary  Anesthesia: General  ASA: ASA status not filed in the log.  Anesthesia Staff: No anesthesia staff entered.  Estimated Blood Loss: 100 mL  Intra-op Medications: * No intraprocedure medications in log *           Anesthesia Record               Intraprocedure I/O Totals       None           Specimen: No specimens collected     Staff:   Circulator: Ross Gillespie RN         Drains and/or Catheters: * None in log *    Tourniquet Times:         Implants: Attune 8 x 12 mm poly  Implants              Findings: Instability    Indications: Wesley Guevara is an 63 y.o. 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  None    The patient was seen in the preoperative area. The risks, benefits, complications, treatment options, non-operative alternatives, expected recovery and outcomes were discussed with the patient. The possibilities of reaction to medication, pulmonary aspiration, injury to surrounding structures, bleeding, recurrent infection, the need for additional procedures, failure to diagnose a condition, and creating a complication requiring transfusion or operation were  discussed with the patient. The patient concurred with the proposed plan, giving informed consent.  The site of surgery was properly noted/marked if necessary per policy. The patient has been actively warmed in preoperative area. Preoperative antibiotics have been ordered and given within 1 hours of incision. Venous thrombosis prophylaxis are not indicated.    Procedure Details: None after anesthesia the leg was prepped draped sterile fashion previous incision was opened and carried down to fascia medial parapatellar arthrotomy was performed patella was pushed to the side there was significant scar tissue and some lateral laxity of the knee therefore we did a thorough debridement we also sent off a swab for culture we then impacted the components they were quite tight the previous probably was then removed we again debrided to get full flexion and extension once this was done we trialed up from the 7 poly all the way to a 12 showed excellent stability excellent range of motion therefore good component was placed wounds were copiously advised closed deep with #1 Ethibond and strata fix #2 Vicryl for the subcutaneous layer and staples for the skin sterile dressing was placed patient returned to PACU in stable condition no complications  Complications:  None; patient tolerated the procedure well.    Disposition: PACU - hemodynamically stable.  Condition: stable         Additional Details: None    Attending Attestation:     Nasim Griffiths  Phone Number: [phone]

## 2023-10-02 NOTE — PERIOPERATIVE NURSING NOTE
Patient arrived to pacu, placed on monitor. SR noted. Vitals stable. Pt awake and restless. Oriented to recovery room. Right knee dressing dry and intact. Ice pack applied to right knee.

## 2023-10-02 NOTE — PERIOPERATIVE NURSING NOTE
Patient in pacu for pre op block. Dr. Jackson at bedside. Time out done. Verbal order to administer 2 mg Versed IV and 100 mcg Fentanyl IV now.

## 2023-10-02 NOTE — PERIOPERATIVE NURSING NOTE
Report called to division nurse TOBIAS Braswell RN. Patient's spouse updated on status and room assignment. Patient resting in bed, vitals stable. Pt states that pain level is tolerable. Denies any nausea.

## 2023-10-02 NOTE — PERIOPERATIVE NURSING NOTE
Patient resting in bed, states that pain level is tolerable. Declines further interventions. Vitals stable. Denies any nausea. Pt tolerates ice chips. Dressing right knee dry and intact. Ice pack maintained.

## 2023-10-03 ENCOUNTER — HOME HEALTH ADMISSION (OUTPATIENT)
Dept: HOME HEALTH SERVICES | Facility: HOME HEALTH | Age: 63
End: 2023-10-03
Payer: COMMERCIAL

## 2023-10-03 ENCOUNTER — PHARMACY VISIT (OUTPATIENT)
Dept: PHARMACY | Facility: CLINIC | Age: 63
End: 2023-10-03
Payer: MEDICARE

## 2023-10-03 VITALS
TEMPERATURE: 98.6 F | SYSTOLIC BLOOD PRESSURE: 148 MMHG | OXYGEN SATURATION: 98 % | DIASTOLIC BLOOD PRESSURE: 65 MMHG | BODY MASS INDEX: 29.53 KG/M2 | WEIGHT: 218.03 LBS | HEART RATE: 67 BPM | HEIGHT: 72 IN | RESPIRATION RATE: 16 BRPM

## 2023-10-03 DIAGNOSIS — M81.8 POSTSURGICAL MALABSORPTION OSTEOPOROSIS WITHOUT PATHOLOGICAL FRACTURE: Primary | ICD-10-CM

## 2023-10-03 DIAGNOSIS — Z96.651 STATUS POST TOTAL RIGHT KNEE REPLACEMENT: ICD-10-CM

## 2023-10-03 PROBLEM — M10.9 GOUT: Status: ACTIVE | Noted: 2023-10-03

## 2023-10-03 PROBLEM — K21.9 GERD (GASTROESOPHAGEAL REFLUX DISEASE): Status: ACTIVE | Noted: 2023-10-03

## 2023-10-03 PROBLEM — R73.03 PRE-DIABETES: Status: ACTIVE | Noted: 2023-10-03

## 2023-10-03 LAB
ANION GAP SERPL CALC-SCNC: 11 MMOL/L
BUN SERPL-MCNC: 13 MG/DL (ref 8–25)
CALCIUM SERPL-MCNC: 8.5 MG/DL (ref 8.5–10.4)
CHLORIDE SERPL-SCNC: 102 MMOL/L (ref 97–107)
CO2 SERPL-SCNC: 25 MMOL/L (ref 24–31)
CREAT SERPL-MCNC: 0.8 MG/DL (ref 0.4–1.6)
ERYTHROCYTE [DISTWIDTH] IN BLOOD BY AUTOMATED COUNT: 14.1 % (ref 11.5–14.5)
GFR SERPL CREATININE-BSD FRML MDRD: >90 ML/MIN/1.73M*2
GLUCOSE BLD MANUAL STRIP-MCNC: 126 MG/DL (ref 74–99)
GLUCOSE BLD MANUAL STRIP-MCNC: 137 MG/DL (ref 74–99)
GLUCOSE SERPL-MCNC: 137 MG/DL (ref 65–99)
HCT VFR BLD AUTO: 40.8 % (ref 41–52)
HGB BLD-MCNC: 13 G/DL (ref 13.5–17.5)
MCH RBC QN AUTO: 28.8 PG (ref 26–34)
MCHC RBC AUTO-ENTMCNC: 31.9 G/DL (ref 32–36)
MCV RBC AUTO: 91 FL (ref 80–100)
NRBC BLD-RTO: 0 /100 WBCS (ref 0–0)
PLATELET # BLD AUTO: 244 X10*3/UL (ref 150–450)
PMV BLD AUTO: 10.7 FL (ref 7.5–11.5)
POTASSIUM SERPL-SCNC: 4.2 MMOL/L (ref 3.4–5.1)
RBC # BLD AUTO: 4.51 X10*6/UL (ref 4.5–5.9)
SODIUM SERPL-SCNC: 138 MMOL/L (ref 133–145)
WBC # BLD AUTO: 12.3 X10*3/UL (ref 4.4–11.3)

## 2023-10-03 PROCEDURE — 82947 ASSAY GLUCOSE BLOOD QUANT: CPT | Mod: 59

## 2023-10-03 PROCEDURE — 36415 COLL VENOUS BLD VENIPUNCTURE: CPT | Performed by: ORTHOPAEDIC SURGERY

## 2023-10-03 PROCEDURE — 97161 PT EVAL LOW COMPLEX 20 MIN: CPT | Mod: GP

## 2023-10-03 PROCEDURE — 2500000001 HC RX 250 WO HCPCS SELF ADMINISTERED DRUGS (ALT 637 FOR MEDICARE OP): Performed by: ORTHOPAEDIC SURGERY

## 2023-10-03 PROCEDURE — 2500000004 HC RX 250 GENERAL PHARMACY W/ HCPCS (ALT 636 FOR OP/ED): Performed by: ORTHOPAEDIC SURGERY

## 2023-10-03 PROCEDURE — 7100000011 HC EXTENDED STAY RECOVERY HOURLY - NURSING UNIT

## 2023-10-03 PROCEDURE — RXMED WILLOW AMBULATORY MEDICATION CHARGE

## 2023-10-03 PROCEDURE — 85027 COMPLETE CBC AUTOMATED: CPT | Performed by: ORTHOPAEDIC SURGERY

## 2023-10-03 PROCEDURE — 97116 GAIT TRAINING THERAPY: CPT | Mod: GP

## 2023-10-03 PROCEDURE — 80048 BASIC METABOLIC PNL TOTAL CA: CPT | Performed by: ORTHOPAEDIC SURGERY

## 2023-10-03 PROCEDURE — 97165 OT EVAL LOW COMPLEX 30 MIN: CPT | Mod: GO

## 2023-10-03 RX ORDER — HYDROCODONE BITARTRATE AND ACETAMINOPHEN 5; 325 MG/1; MG/1
1 TABLET ORAL
Qty: 36 TABLET | Refills: 0 | Status: SHIPPED | OUTPATIENT
Start: 2023-10-03 | End: 2023-10-17 | Stop reason: WASHOUT

## 2023-10-03 RX ORDER — CEPHALEXIN 500 MG/1
500 CAPSULE ORAL 3 TIMES DAILY
Qty: 21 CAPSULE | Refills: 0 | Status: SHIPPED | OUTPATIENT
Start: 2023-10-03 | End: 2023-10-10

## 2023-10-03 RX ORDER — ASPIRIN 81 MG/1
81 TABLET ORAL 2 TIMES DAILY
Status: SHIPPED | OUTPATIENT
Start: 2023-10-03 | End: 2023-10-17

## 2023-10-03 RX ORDER — CEFAZOLIN SODIUM 2 G/100ML
2 INJECTION, SOLUTION INTRAVENOUS ONCE
Status: COMPLETED | OUTPATIENT
Start: 2023-10-03 | End: 2023-10-03

## 2023-10-03 RX ADMIN — CEFAZOLIN SODIUM 2 G: 2 INJECTION, SOLUTION INTRAVENOUS at 01:13

## 2023-10-03 RX ADMIN — DOCUSATE SODIUM 100 MG: 100 CAPSULE, LIQUID FILLED ORAL at 09:34

## 2023-10-03 RX ADMIN — ASPIRIN 81 MG: 81 TABLET, COATED ORAL at 09:34

## 2023-10-03 RX ADMIN — FERROUS SULFATE TAB 325 MG (65 MG ELEMENTAL FE) 65 MG OF IRON: 325 (65 FE) TAB at 09:34

## 2023-10-03 RX ADMIN — CEFAZOLIN SODIUM 2 G: 2 INJECTION, SOLUTION INTRAVENOUS at 09:54

## 2023-10-03 RX ADMIN — HYDROCODONE BITARTRATE AND ACETAMINOPHEN 2 TABLET: 5; 325 TABLET ORAL at 00:30

## 2023-10-03 RX ADMIN — HYDROCODONE BITARTRATE AND ACETAMINOPHEN 2 TABLET: 5; 325 TABLET ORAL at 07:56

## 2023-10-03 ASSESSMENT — PAIN - FUNCTIONAL ASSESSMENT
PAIN_FUNCTIONAL_ASSESSMENT: 0-10

## 2023-10-03 ASSESSMENT — COGNITIVE AND FUNCTIONAL STATUS - GENERAL
MOBILITY SCORE: 22
DAILY ACTIVITIY SCORE: 24
DAILY ACTIVITIY SCORE: 24
WALKING IN HOSPITAL ROOM: A LITTLE
CLIMB 3 TO 5 STEPS WITH RAILING: A LITTLE

## 2023-10-03 ASSESSMENT — PAIN SCALES - GENERAL
PAINLEVEL_OUTOF10: 4
PAINLEVEL_OUTOF10: 0 - NO PAIN
PAINLEVEL_OUTOF10: 0 - NO PAIN
PAINLEVEL_OUTOF10: 8
PAINLEVEL_OUTOF10: 4
PAINLEVEL_OUTOF10: 8

## 2023-10-03 ASSESSMENT — ACTIVITIES OF DAILY LIVING (ADL)
ADL_ASSISTANCE: INDEPENDENT
BATHING_ASSISTANCE: MODIFIED INDEPENDENT (DEVICE)
ADL_ASSISTANCE: INDEPENDENT

## 2023-10-03 NOTE — PROGRESS NOTES
Occupational Therapy    Evaluation    Patient Name: Wesley Guevara  MRN: [id number]  Today's Date: 10/3/2023  Time Calculation  Start Time: 0925  Stop Time: 0949  Time Calculation (min): 24 min      Assessment:  OT Assessment: s/p R TKA. no acute OT needs  Prognosis: Good  Barriers to Discharge: None  Evaluation/Treatment Tolerance: Patient tolerated treatment well  Medical Staff Made Aware: Yes (RN)  Prognosis: Good  Barriers to Discharge: None  Evaluation/Treatment Tolerance: Patient tolerated treatment well  Medical Staff Made Aware: Yes (RN)  Plan:  No Skilled OT: No acute OT goals identified  OT Frequency:  (0)  OT Discharge Recommendations: No further acute OT  OT Recommended Transfer Status: Assist of 1, Stand by assist  OT - OK to Discharge: Yes       Subjective   Current Problem:  1. Failure of total knee replacement, subsequent encounter        2. 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 Griffiths  Past Medical History Relevant to Rehab: essential HTN, DM II, Hyperlipidemia  Family/Caregiver Present: No  Co-Treatment: PT  Co-Treatment Reason: safety post OP - R TKA  Prior to Session Communication: Bedside nurse  Patient Position Received: Bed, 3 rail up (supine in bed with head of bed elevated)  Precautions:  Post-Surgical Precautions: Right total knee precautions  Pain:  Pain Assessment  Pain Assessment: 0-10  Pain Score: 0 - No pain    Objective   Cognition:  Overall Cognitive Status: Within Functional Limits    Home Living:  Type of Home: House  Lives With: Spouse  Home Adaptive Equipment: Cane, Walker rolling or standard  Home Layout: Two level, 1/2 bath on main level, Bed/bath upstairs (will be staying on first floor with half bath)  Home Access: Level  entry  Bathroom Shower/Tub: Tub/shower unit  Bathroom Toilet: Handicapped height  Bathroom Equipment: Grab bars in shower  Prior Function:  Level of Java: Independent with ADLs and functional transfers  ADL Assistance: Independent  Homemaking Assistance: Independent  Ambulatory Assistance: Independent  Vocational: Full time employment ()  IADL History:  Homemaking Responsibilities: Yes  Current License: Yes  Mode of Transportation: Car  ADL:  Eating Assistance: Independent  Grooming Assistance: Independent  Bathing Assistance: Modified independent (Device) (per clinical judgement)  UE Dressing Assistance: Independent  LE Dressing Assistance: Stand by  LE Dressing Deficit: Steadying (per clinical judgement)  Toileting Assistance with Device: Stand by  Toileting Deficit: Supervison/safety (per clinical judgement)  Activity Tolerance:  Endurance: Other (Comment) (tolerates eval well)  Bed Mobility/Transfers: Bed Mobility  Bed Mobility: Yes  Bed Mobility 1  Bed Mobility 1: Supine to sitting  Level of Assistance 1: Directs care (Comment) (head of bed elevated - Mod I)   Modalities:  Modalities Used: Yes  Vision:Vision - Basic Assessment  Current Vision: No visual deficits  Strength:  Strength Comments: B UE WFL  Extremities: RUE   RUE : Within Functional Limits  L UE: Within Functional Limits    Outcome Measures:[facility] Daily Activity  Putting on and taking off regular lower body clothing: None  Bathing (including washing, rinsing, drying): None  Putting on and taking off regular upper body clothing: None  Toileting, which includes using toilet, bedpan or urinal: None  Taking care of personal grooming such as brushing teeth: None  Eating Meals: None  Daily Activity - Total Score: 24    Education Documentation  Precautions, taught by My Zavala OT at 10/3/2023 12:07 PM.  Learner: Patient  Readiness: Acceptance  Method: Explanation, Demonstration  Response: Verbalizes Understanding,  Demonstrated Understanding    ADL Training, taught by My Zavala OT at 10/3/2023 12:07 PM.  Learner: Patient  Readiness: Acceptance  Method: Explanation, Demonstration  Response: Verbalizes Understanding, Demonstrated Understanding    IP EDUCATION:  Education  Individual(s) Educated: Patient  Education Provided: Fall precautons, POC discussed and agreed upon  Risk and Benefits Discussed with Patient/Caregiver/Other: yes  Patient/Caregiver Demonstrated Understanding: yes  Plan of Care Discussed and Agreed Upon: yes  Patient Response to Education: Patient/Caregiver Verbalized Understanding of Information, Patient/Caregiver Asked Appropriate Questions    Plan:  OT discharge. Patient demo no acute OT needs. Assistance from wife available as needed upon discharge home, as patient states his wife will be working from home. Patient  agrees no acute OT needs

## 2023-10-03 NOTE — PROGRESS NOTES
Patient home with spouse.  Has had knee done over a year ago.  Agreeable to HHC.  No preference to agency.  Can't remember who he last had.    [facility]  ADOD:  today  Essie Grover RN

## 2023-10-03 NOTE — PROGRESS NOTES
"Physical Therapy    Physical Therapy Evaluation & Treatment    Patient Name: Wesley Guevara  MRN: [id number]  Today's Date: 10/3/2023   Time Calculation  Start Time: 0929  Stop Time: 0954  Time Calculation (min): 25 min    Assessment/Plan   PT Assessment  PT Assessment Results: Decreased strength, Decreased range of motion, Decreased endurance, Impaired balance, Decreased mobility, Decreased safety awareness  Rehab Prognosis: Good  Evaluation/Treatment Tolerance: Patient tolerated treatment well  Medical Staff Made Aware: Yes  End of Session Communication: Bedside nurse  Assessment Comment: 64 y/o male who is s/p R TKA revision. Presents with decreased R knee ROM, strength and decreased activity tolerance.  End of Session Patient Position: Up in chair  IP OR SWING BED PT PLAN  Inpatient or Swing Bed: Inpatient  PT Plan  Treatment/Interventions: Bed mobility, Transfer training, Gait training, Stair training, Balance training, Range of motion, Strengthening, Endurance training, Therapeutic activity, Therapeutic exercise, Home exercise program  PT Plan: Skilled PT  PT Frequency: 6 times per week  PT Discharge Recommendations: Low intensity level of continued care  PT Recommended Transfer Status: Assist x1, Assistive device  PT - OK to Discharge: Yes      Subjective     General Visit Information:  General  Reason for Referral: surgery: R TKA revision  Referred By: Dr. Griffiths  Past Medical History Relevant to Rehab: Per pt report, s/p R TKA \"14 months ago.\"  R knee OA.  Prior to Session Communication: Bedside nurse  Patient Position Received: Bed, 3 rail up  Preferred Learning Style: verbal, visual  General Comment: impulsive and motivated  Home Living:  Home Living  Type of Home: House  Lives With: Spouse  Home Layout: Multi-level, Bed/bath upstairs, 1/2 bath on main level, Able to live on main level with bedroom/bathroom  Home Access: Stairs to enter with rails  Entrance Stairs-Number of Steps: 1  Home Living Comments: " First floor set-up. Second floor full bathroom.  Prior Level of Function:  Prior Function Per Pt/Caregiver Report  Level of [place]: Independent with ADLs and functional transfers  ADL Assistance: Independent  Homemaking Assistance: Independent  Ambulatory Assistance: Independent  Vocational: Full time employment (shipping/receiving)  Precautions:  Precautions  LE Weight Bearing Status: Weight Bearing as Tolerated  Post-Surgical Precautions: Right total knee precautions  Vital Signs:       Objective   Pain:  Pain Assessment  Pain Assessment: 0-10  Pain Score: 0 - No pain  Cognition:  Cognition  Overall Cognitive Status: Within Functional Limits  Attention: Within Functional Limits  Problem Solving: Within Functional Limits  Insight: Mild  Impulsive: Mildly    General Assessments:        Activity Tolerance  Endurance: Tolerates 10 - 20 min exercise with multiple rests    Sensation  Sensation Comment: denied numbness/tingling      Static Sitting Balance  Static Sitting-Level of Assistance: Distant supervision    Static Standing Balance  Static Standing-Level of Assistance: Distant supervision  Static Standing-Comment/Number of Minutes: walker  Functional Assessments:  Bed Mobility  Bed Mobility: Yes  Bed Mobility 1  Bed Mobility 1: Supine to sitting  Level of Assistance 1: Directs care (Comment) (HOB elevated, use of bedrails)  Bed Mobility Comments 1: HOB elevated    Transfers  Transfer: Yes  Transfer 1  Transfer From 1: Sit to  Transfer to 1: Stand  Transfer Device 1: Walker  Transfer Level of Assistance 1: Distant supervision  Trials/Comments 1: cues for hand placement    Ambulation/Gait Training  Ambulation/Gait Training Performed: Yes  Ambulation/Gait Training 1  Surface 1: Level tile  Device 1: Rolling walker  Assistance 1: Distant supervision, Minimal verbal cues  Quality of Gait 1:  (decreased step height and length, minimal foot clearance, decreased stance time RLE. Step-to gait  "pattern)  Comments/Distance (ft) 1: 150 ft x 2 trials (Instructed pt on proper walker use and sequencing [facility] walker. pt initially amb with step-to gait pattern, progressed to reciprocal gait pattern [facility] education and instruction.)    Stairs  Stairs: Yes  Stairs  Rails 1: Bilateral  Device 1: Railing  Assistance 1: Distant supervision  Comment/Number of Steps 1: step-to gait pattern (instructed pt on step to gait pattern and appropriate \"up with the good and down with the bad.\")  Extremity/Trunk Assessments:  RLE   RLE : Exceptions to WFL  AROM RLE (degrees)  RLE AROM Comment: R knee 80 degrees flex sitting  Strength RLE  RLE Overall Strength: Greater than or equal to 3/5 as evidenced by functional mobility  LLE   LLE : Within Functional Limits  Treatments:  Gait and stair training - see above  Outcome Measures:  [facility] Basic Mobility  Turning from your back to your side while in a flat bed without using bedrails: None  Moving from lying on your back to sitting on the side of a flat bed without using bedrails: None  Moving to and from bed to chair (including a wheelchair): None  Standing up from a chair using your arms (e.g. wheelchair or bedside chair): None  To walk in hospital room: A little  Climbing 3-5 steps with railing: A little  Basic Mobility - Total Score: 22    Encounter Problems       Encounter Problems (Active)       Balance       LTG - Patient will maintain balance to allow for safe mobility (Progressing)       Start:  10/03/23    Expected End:  10/10/23               Mobility       LTG - Patient will navigate 4-6 steps with rails/device (Progressing)       Start:  10/03/23    Expected End:  10/10/23            STG - Patient will ambulate (Progressing)       Start:  10/03/23    Expected End:  10/10/23       >/= 400 ft with wheeled walker and reciprocal gait and independence            Pain - Adult          Safety       LTG - Patient will demonstrate safety requirements appropriate to " situation/environment (Progressing)       Start:  10/03/23    Expected End:  10/10/23               Transfers       STG - Patient to transfer to and from sit to supine (Progressing)       Start:  10/03/23    Expected End:  10/10/23       With wheeled walker and mod independence                Education Documentation  Precautions, taught by Deisy Lee, PT at 10/3/2023 12:10 PM.  Learner: Patient  Readiness: Acceptance  Method: Explanation  Response: Verbalizes Understanding    Home Exercise Program, taught by Deisy Lee PT at 10/3/2023 12:10 PM.  Learner: Patient  Readiness: Acceptance  Method: Explanation  Response: Verbalizes Understanding    Mobility Training, taught by Deisy Lee PT at 10/3/2023 12:10 PM.  Learner: Patient  Readiness: Acceptance  Method: Explanation  Response: Verbalizes Understanding    Education Comments  No comments found.

## 2023-10-03 NOTE — CARE PLAN
Problem: Diabetes  Goal: Achieve decreasing blood glucose levels by end of shift  Outcome: Progressing  Goal: Increase stability of blood glucose readings by end of shift  Outcome: Progressing  Goal: Decrease in ketones present in urine by end of shift  Outcome: Progressing  Goal: Maintain electrolyte levels within acceptable range throughout shift  Outcome: Progressing  Goal: Maintain glucose levels >70mg/dl to <250mg/dl throughout shift  Outcome: Progressing  Goal: No changes in neurological exam by end of shift  Outcome: Progressing  Goal: Learn about and adhere to nutrition recommendations by end of shift  Outcome: Progressing  Goal: Vital signs within normal range for age by end of shift  Outcome: Progressing  Goal: Increase self care and/or family involovement by end of shift  Outcome: Progressing  Goal: Receive DSME education by end of shift  Outcome: Progressing

## 2023-10-03 NOTE — PROGRESS NOTES
"Wesley Guevara is a 63 y.o. male on day 1 of admission presenting with Failure of total knee replacement, subsequent encounter.    Subjective   POD 1 s/p regional nerve block for TKA. DUMONT. OOB to chair with PT today. Pain controlled. Pt has no questions or concerns regarding anesthetic care.      Objective     Physical Exam    Last Recorded Vitals  Blood pressure 147/74, pulse 59, temperature 36.6 °C (97.9 °F), temperature source Temporal, resp. rate 16, height 1.829 m (6' 0.01\"), weight 98.9 kg (218 lb 0.6 oz), SpO2 98 %.  Intake/Output last 3 Shifts:  I/O last 3 completed shifts:  In: 5698.3 (57.6 mL/kg) [I.V.:5698.3 (57.6 mL/kg)]  Out: 1025 (10.4 mL/kg) [Urine:900 (0.3 mL/kg/hr); Blood:125]  Weight: 98.9 kg     Relevant Results    Shawnee Liang, APRN-CRNA      "

## 2023-10-03 NOTE — NURSING NOTE
Assumed patient care. Patient resting in bed at this time watching TV. Patient alert and oriented x4, ice pack to right knee. Patient rates pain 3/10 and denies any pain medication at this time. Call light within reach. Voided 400mls of clear urine.

## 2023-10-03 NOTE — CARE PLAN
Problem: Balance  Goal: LTG - Patient will maintain balance to allow for safe mobility  Outcome: Progressing     Problem: Mobility  Goal: LTG - Patient will navigate 4-6 steps with rails/device  Outcome: Progressing  Goal: STG - Patient will ambulate  Description: >/= 400 ft with wheeled walker and reciprocal gait and independence  Outcome: Progressing     Problem: Safety  Goal: LTG - Patient will demonstrate safety requirements appropriate to situation/environment  Outcome: Progressing     Problem: Transfers  Goal: STG - Patient to transfer to and from sit to supine  Description: With wheeled walker and mod independence  Outcome: Progressing

## 2023-10-03 NOTE — DISCHARGE SUMMARY
Discharge Diagnosis  Failure of total knee replacement, subsequent encounter    Issues Requiring Follow-Up      Test Results Pending At Discharge  Pending Labs       Order Current Status    Sterile Fluid Culture/Smear Preliminary result            Hospital Course   Patient was admitted underwent the above surgery postoperative did very well remained stable vital signs neurovascular status and labs therefore was discharged to home with home care    Pertinent Physical Exam At Time of Discharge  Physical Exam    Home Medications     Medication List      CONTINUE taking these medications     lisinopril 10 mg tablet; Take 1 tablet (10 mg) by mouth once daily.   metFORMIN  mg 24 hr tablet; Commonly known as: Glucophage-XR; Take   1 tablet (500 mg) by mouth 2 times a day with meals.   rosuvastatin 10 mg tablet; Commonly known as: Crestor; Take 1 tablet (10   mg) by mouth once daily.       Outpatient Follow-Up  No future appointments.  Follow-up 2 weeks with Dr. Tunde Griffiths MD

## 2023-10-04 ENCOUNTER — HOME CARE VISIT (OUTPATIENT)
Dept: HOME HEALTH SERVICES | Facility: HOME HEALTH | Age: 63
End: 2023-10-04
Payer: COMMERCIAL

## 2023-10-04 VITALS
OXYGEN SATURATION: 98 % | DIASTOLIC BLOOD PRESSURE: 68 MMHG | TEMPERATURE: 98.6 F | RESPIRATION RATE: 18 BRPM | SYSTOLIC BLOOD PRESSURE: 138 MMHG | HEART RATE: 76 BPM

## 2023-10-04 PROCEDURE — G0151 HHCP-SERV OF PT,EA 15 MIN: HCPCS

## 2023-10-04 PROCEDURE — 0023 HH SOC

## 2023-10-04 SDOH — HEALTH STABILITY: PHYSICAL HEALTH: EXERCISE COMMENTS: 1X10 SUPINE:APS, GLUTE AND QUAD SETS, HEEL SLIDES, SLR, SAQ, HIP ABD    UP WALKING 1X/HOUR

## 2023-10-04 SDOH — HEALTH STABILITY: PHYSICAL HEALTH: EXERCISE TYPE: SUPINE 1X10

## 2023-10-04 ASSESSMENT — ACTIVITIES OF DAILY LIVING (ADL)
AMBULATION ASSISTANCE: 1
AMBULATION_DISTANCE/DURATION_TOLERATED: 50 FEET
AMBULATION ASSISTANCE ON FLAT SURFACES: 1
AMBULATION ASSISTANCE: STAND BY ASSIST
AMBULATION ASSISTANCE: ONE PERSON
ENTERING_EXITING_HOME: MINIMUM ASSIST
CURRENT_FUNCTION: CONTACT GUARD ASSIST
PHYSICAL TRANSFERS ASSESSED: 1
OASIS_M1830: 03

## 2023-10-04 ASSESSMENT — ENCOUNTER SYMPTOMS
PAIN: 1
LIMITED RANGE OF MOTION: 1
HIGHEST PAIN SEVERITY IN PAST 24 HOURS: 1/10
LOWEST PAIN SEVERITY IN PAST 24 HOURS: 0/10
SUBJECTIVE PAIN PROGRESSION: GRADUALLY IMPROVING
PAIN LOCATION - PAIN SEVERITY: 1/10
PAIN LOCATION - PAIN FREQUENCY: INTERMITTENT
PAIN LOCATION - PAIN QUALITY: ACHING
MUSCLE WEAKNESS: 1
PAIN SEVERITY GOAL: 0/10
PERSON REPORTING PAIN: PATIENT
PAIN LOCATION: RIGHT KNEE

## 2023-10-06 ENCOUNTER — HOME CARE VISIT (OUTPATIENT)
Dept: HOME HEALTH SERVICES | Facility: HOME HEALTH | Age: 63
End: 2023-10-06
Payer: COMMERCIAL

## 2023-10-06 VITALS
OXYGEN SATURATION: 97 % | DIASTOLIC BLOOD PRESSURE: 60 MMHG | TEMPERATURE: 97.6 F | HEART RATE: 76 BPM | SYSTOLIC BLOOD PRESSURE: 120 MMHG

## 2023-10-06 LAB
BACTERIA FLD CULT: NORMAL
GRAM STN SPEC: NORMAL
GRAM STN SPEC: NORMAL

## 2023-10-06 PROCEDURE — G0151 HHCP-SERV OF PT,EA 15 MIN: HCPCS

## 2023-10-06 SDOH — HEALTH STABILITY: PHYSICAL HEALTH: EXERCISE ACTIVITIES SETS: 1

## 2023-10-06 SDOH — HEALTH STABILITY: PHYSICAL HEALTH: PHYSICAL EXERCISE: SEATED

## 2023-10-06 SDOH — HEALTH STABILITY: PHYSICAL HEALTH: PHYSICAL EXERCISE: SUPINE

## 2023-10-06 SDOH — HEALTH STABILITY: PHYSICAL HEALTH

## 2023-10-06 SDOH — HEALTH STABILITY: PHYSICAL HEALTH: EXERCISE ACTIVITY: SUPINE: APS, GLUTE AND QUAD SETS, SLR, SAQ, HIP ABD

## 2023-10-06 SDOH — HEALTH STABILITY: PHYSICAL HEALTH: PHYSICAL EXERCISE: 10

## 2023-10-06 SDOH — HEALTH STABILITY: PHYSICAL HEALTH: EXERCISE TYPE: ADDED SEATED HEP TODAY

## 2023-10-06 SDOH — HEALTH STABILITY: PHYSICAL HEALTH: EXERCISE COMMENTS: CUES AS NEEDED FOR ACCURACY AND FOR POSTURE WHEN SEATED. CUES TO USE CANE PRN FOR SUPINE SLR, SAQ.

## 2023-10-06 SDOH — HEALTH STABILITY: PHYSICAL HEALTH: EXERCISE ACTIVITY: MARCHING, LAQ, HIP ABD/ADD, HEEL SLIDE

## 2023-10-06 ASSESSMENT — ACTIVITIES OF DAILY LIVING (ADL)
AMBULATION ASSISTANCE ON FLAT SURFACES: 1
ENTERING_EXITING_HOME: MINIMUM ASSIST
AMBULATION_DISTANCE/DURATION_TOLERATED: 90

## 2023-10-06 ASSESSMENT — ENCOUNTER SYMPTOMS
DENIES PAIN: 1
LOWEST PAIN SEVERITY IN PAST 24 HOURS: 0/10
PERSON REPORTING PAIN: PATIENT
HIGHEST PAIN SEVERITY IN PAST 24 HOURS: 2/10

## 2023-10-10 ENCOUNTER — HOME CARE VISIT (OUTPATIENT)
Dept: HOME HEALTH SERVICES | Facility: HOME HEALTH | Age: 63
End: 2023-10-10
Payer: COMMERCIAL

## 2023-10-10 VITALS
RESPIRATION RATE: 18 BRPM | OXYGEN SATURATION: 99 % | HEART RATE: 72 BPM | DIASTOLIC BLOOD PRESSURE: 60 MMHG | TEMPERATURE: 98.5 F | SYSTOLIC BLOOD PRESSURE: 124 MMHG

## 2023-10-10 PROCEDURE — G0151 HHCP-SERV OF PT,EA 15 MIN: HCPCS

## 2023-10-10 SDOH — HEALTH STABILITY: PHYSICAL HEALTH: EXERCISE ACTIVITY: CALF RAISE, HIP ABD, HIP EXT, SLR, HS CURL, MINI SQUAT, STANDING KNEE FLEXION STRETCH

## 2023-10-10 SDOH — HEALTH STABILITY: PHYSICAL HEALTH: PHYSICAL EXERCISE: 10

## 2023-10-10 SDOH — HEALTH STABILITY: PHYSICAL HEALTH: PHYSICAL EXERCISE: SUPINE

## 2023-10-10 SDOH — HEALTH STABILITY: PHYSICAL HEALTH: EXERCISE ACTIVITY: APS, GLUTE AND QUAD SETS, HEEL SLIDE, SLR, SAQ, HIP ABD

## 2023-10-10 SDOH — HEALTH STABILITY: PHYSICAL HEALTH: EXERCISE COMMENTS: CUES FOR ACCURACY AND PACING AS NEEDED.

## 2023-10-10 SDOH — HEALTH STABILITY: PHYSICAL HEALTH: EXERCISE ACTIVITIES SETS: 1

## 2023-10-10 SDOH — HEALTH STABILITY: PHYSICAL HEALTH: PHYSICAL EXERCISE: STANDING

## 2023-10-10 SDOH — HEALTH STABILITY: PHYSICAL HEALTH

## 2023-10-10 SDOH — HEALTH STABILITY: PHYSICAL HEALTH: EXERCISE ACTIVITY: MARCHING, LAQ, HEEL PROP WITH KNEE EXTENSION, HIP ABD/ADD, HEEL SLIDE

## 2023-10-10 SDOH — HEALTH STABILITY: PHYSICAL HEALTH: PHYSICAL EXERCISE: SEATED

## 2023-10-10 ASSESSMENT — ENCOUNTER SYMPTOMS
PERSON REPORTING PAIN: PATIENT
DENIES PAIN: 1

## 2023-10-10 ASSESSMENT — ACTIVITIES OF DAILY LIVING (ADL)
AMBULATION ASSISTANCE ON FLAT SURFACES: 1
AMBULATION_DISTANCE/DURATION_TOLERATED: 120 FEET

## 2023-10-12 ENCOUNTER — HOME CARE VISIT (OUTPATIENT)
Dept: HOME HEALTH SERVICES | Facility: HOME HEALTH | Age: 63
End: 2023-10-12
Payer: COMMERCIAL

## 2023-10-12 VITALS
TEMPERATURE: 97.6 F | RESPIRATION RATE: 18 BRPM | HEART RATE: 73 BPM | OXYGEN SATURATION: 96 % | SYSTOLIC BLOOD PRESSURE: 132 MMHG | DIASTOLIC BLOOD PRESSURE: 72 MMHG

## 2023-10-12 PROCEDURE — G0151 HHCP-SERV OF PT,EA 15 MIN: HCPCS

## 2023-10-12 SDOH — HEALTH STABILITY: PHYSICAL HEALTH: EXERCISE ACTIVITY: MARCHING, LAQ, HEEL SLIDE, HEEL PROP, HIP ABD/ADD, APS

## 2023-10-12 SDOH — HEALTH STABILITY: PHYSICAL HEALTH: EXERCISE ACTIVITY: CALF RAISE, HIP ABD/EXT/FLEX, HS CURL, MINI SQUAT, STANDING KNEE FLEX STRETCH

## 2023-10-12 SDOH — HEALTH STABILITY: PHYSICAL HEALTH: EXERCISE ACTIVITIES SETS: 1

## 2023-10-12 SDOH — HEALTH STABILITY: PHYSICAL HEALTH: PHYSICAL EXERCISE: 15

## 2023-10-12 SDOH — HEALTH STABILITY: PHYSICAL HEALTH: EXERCISE ACTIVITY: APS, GLUTE AND QUAD SETS, HEEL SLIDE, SLR, SAQ, HIP ABD

## 2023-10-12 SDOH — HEALTH STABILITY: PHYSICAL HEALTH: PHYSICAL EXERCISE: SITTING

## 2023-10-12 SDOH — HEALTH STABILITY: PHYSICAL HEALTH

## 2023-10-12 SDOH — HEALTH STABILITY: PHYSICAL HEALTH: EXERCISE COMMENTS: NO CUEING REQUIRED FOR PACING OR ACCURACY.

## 2023-10-12 SDOH — HEALTH STABILITY: PHYSICAL HEALTH: PHYSICAL EXERCISE: STANDING

## 2023-10-12 SDOH — HEALTH STABILITY: PHYSICAL HEALTH: PHYSICAL EXERCISE: SUPINE

## 2023-10-12 ASSESSMENT — ENCOUNTER SYMPTOMS
HIGHEST PAIN SEVERITY IN PAST 24 HOURS: 0/10
LOWEST PAIN SEVERITY IN PAST 24 HOURS: 0/10
DENIES PAIN: 1

## 2023-10-17 ENCOUNTER — HOME CARE VISIT (OUTPATIENT)
Dept: HOME HEALTH SERVICES | Facility: HOME HEALTH | Age: 63
End: 2023-10-17
Payer: COMMERCIAL

## 2023-10-17 ENCOUNTER — OFFICE VISIT (OUTPATIENT)
Dept: ORTHOPEDIC SURGERY | Facility: CLINIC | Age: 63
End: 2023-10-17
Payer: COMMERCIAL

## 2023-10-17 VITALS — HEIGHT: 73 IN | BODY MASS INDEX: 29.69 KG/M2 | WEIGHT: 224 LBS

## 2023-10-17 VITALS
TEMPERATURE: 97.8 F | RESPIRATION RATE: 18 BRPM | DIASTOLIC BLOOD PRESSURE: 68 MMHG | HEART RATE: 76 BPM | OXYGEN SATURATION: 98 % | SYSTOLIC BLOOD PRESSURE: 132 MMHG

## 2023-10-17 DIAGNOSIS — Z96.651 STATUS POST REVISION OF TOTAL REPLACEMENT OF RIGHT KNEE: ICD-10-CM

## 2023-10-17 PROCEDURE — 4010F ACE/ARB THERAPY RXD/TAKEN: CPT | Performed by: ORTHOPAEDIC SURGERY

## 2023-10-17 PROCEDURE — 1036F TOBACCO NON-USER: CPT | Performed by: ORTHOPAEDIC SURGERY

## 2023-10-17 PROCEDURE — 3044F HG A1C LEVEL LT 7.0%: CPT | Performed by: ORTHOPAEDIC SURGERY

## 2023-10-17 PROCEDURE — G0151 HHCP-SERV OF PT,EA 15 MIN: HCPCS

## 2023-10-17 PROCEDURE — 99024 POSTOP FOLLOW-UP VISIT: CPT | Performed by: ORTHOPAEDIC SURGERY

## 2023-10-17 SDOH — HEALTH STABILITY: PHYSICAL HEALTH: PHYSICAL EXERCISE: 15

## 2023-10-17 SDOH — HEALTH STABILITY: PHYSICAL HEALTH: EXERCISE ACTIVITIES SETS: 1

## 2023-10-17 SDOH — HEALTH STABILITY: PHYSICAL HEALTH: PHYSICAL EXERCISE: STANDING

## 2023-10-17 SDOH — HEALTH STABILITY: PHYSICAL HEALTH: EXERCISE ACTIVITY: MARCHING, LAQ, HEEL PROP, APS, HIP ABD/ADD

## 2023-10-17 SDOH — HEALTH STABILITY: PHYSICAL HEALTH: EXERCISE ACTIVITY: CALF RAISE, HIP EXT/FLEX/ABD, HS CURL, MINI SQUAT, STAIR STRETCH

## 2023-10-17 SDOH — HEALTH STABILITY: PHYSICAL HEALTH

## 2023-10-17 SDOH — HEALTH STABILITY: PHYSICAL HEALTH: PHYSICAL EXERCISE: SUPINE

## 2023-10-17 SDOH — HEALTH STABILITY: PHYSICAL HEALTH: PHYSICAL EXERCISE: SEATED

## 2023-10-17 SDOH — HEALTH STABILITY: PHYSICAL HEALTH: EXERCISE COMMENTS: BALANCE: SIDE STEPPING 3X EACH DIRECTION, 4 SQUARE STEPS  NO LOB NOTED

## 2023-10-17 SDOH — HEALTH STABILITY: PHYSICAL HEALTH: EXERCISE ACTIVITY: APS, GLUTE AND QUAD SETS, HEEL SLIDE, SLR, SAQ, HIP ABD/ADD

## 2023-10-17 ASSESSMENT — PAIN SCALES - GENERAL: PAINLEVEL_OUTOF10: 0 - NO PAIN

## 2023-10-17 ASSESSMENT — PAIN - FUNCTIONAL ASSESSMENT: PAIN_FUNCTIONAL_ASSESSMENT: NO/DENIES PAIN

## 2023-10-17 ASSESSMENT — ENCOUNTER SYMPTOMS: DENIES PAIN: 1

## 2023-10-17 NOTE — PROGRESS NOTES
Subjective    Patient ID: Trini Guevara is a 63 y.o. male.    Chief Complaint: Post-op of the Right Knee (S/P Revision )     Last Surgery: Revision Right Total Knee Arthroplasty (depuy Attune Polys Tc3 Rp Stems, Sleeves) - Right  Last Surgery Date: 10/2/2023    HPI  Doing very well  Objective   Ortho Exam range of motion is 3 to 95 degrees excellent stability wounds healing well no effusion no distal edema    Image Results:  NM bone whole body 3 phase  PROCEDURE:         BONE AND OR JOINT 3 PHASE - IN  3533  REASON FOR EXAM: Z96.651    RESULT: MRN: [id number]  Patient Name: TRINI GUEVARA    STUDY:  BONE AND OR JOINT 3 PHASE; LOC OF ABSCESS SINGLE AREA; 6/21/2023 12:16 pm    INDICATION:  Z96.651. 63-year-old man with history of right knee replacement in 2022,  with right knee swelling since the surgery. Patient reports warmth to the  touch, without fever, trauma, open wounds.    COMPARISON:  Knee radiographs 06/15/2023.    ACCESSION NUMBER(S):  [id number]; [id number]    ORDERING CLINICIAN:  ТАТЬЯНА ARRIOLA    TECHNIQUE:  Injected Dose: 27.5 mCi Tc-99m MDP. Three-phase bone scan was then performed  for the bilateral knees.    In addition, 428 microcuries indium 111 labeled white blood cells were  administered on 06/20/2023. Concurrent white blood cell scan was then  performed on 06/21/2020 23.    FINDINGS:  Bone scan:  Blood Flow: There is asymmetrically increased radiotracer uptake in the  right knee adjacent to the arthroplasty.  Blood Pool: There is asymmetrically increased radiotracer uptake in the  right knee adjacent to the arthroplasty.  Delayed: There is asymmetrically increased radiotracer uptake in the right  knee adjacent to the arthroplasty components.    Blood cell scan: No sign of abnormally increased radiotracer uptake. There  is specifically no uptake as intense or more intense than the delayed phase  bone scan uptake in the region of the right knee.    IMPRESSION:  Positive 3 phase bone scan with  increased radiotracer uptake to the right  knee adjacent to the arthroplasty components. Clinical correlation for  loosening or postsurgical change/complication is recommended.    Infectious process is less favored, as there is no sign of abnormally  increased white blood cell uptake in the right knee.  Dictation workstation:   [id number]    Original Interpreting Physician:   SERENA SOTELO MD  Original Transcribed by/Date: MMODAL Jun 20 2023  7:05A  Original Electronically Signed by/Date: SERENA SOTELO MD Jun 21 2023 12:38P    Addendum Interpreting Physician:  Addendum Transcribed by/Date: NO ADDENDUM  Addendum Electronically Signed by/Date:  NM tumor localization limited  PROCEDURE:         LOC OF ABSCESS SINGLE AREA - INM  3529  REASON FOR EXAM: Z96.651    RESULT: MRN: [id number]  Patient Name: TRINI CHENG    STUDY:  BONE AND OR JOINT 3 PHASE; LOC OF ABSCESS SINGLE AREA; 6/21/2023 12:16 pm    INDICATION:  Z96.651. 63-year-old man with history of right knee replacement in 2022,  with right knee swelling since the surgery. Patient reports warmth to the  touch, without fever, trauma, open wounds.    COMPARISON:  Knee radiographs 06/15/2023.    ACCESSION NUMBER(S):  [id number]; [id number]    ORDERING CLINICIAN:  ТАТЬЯНА ARRIOLA    TECHNIQUE:  Injected Dose: 27.5 mCi Tc-99m MDP. Three-phase bone scan was then performed  for the bilateral knees.    In addition, 428 microcuries indium 111 labeled white blood cells were  administered on 06/20/2023. Concurrent white blood cell scan was then  performed on 06/21/2020 23.    FINDINGS:  Bone scan:  Blood Flow: There is asymmetrically increased radiotracer uptake in the  right knee adjacent to the arthroplasty.  Blood Pool: There is asymmetrically increased radiotracer uptake in the  right knee adjacent to the arthroplasty.  Delayed: There is asymmetrically increased radiotracer uptake in the right  knee adjacent to the arthroplasty components.    Blood cell scan: No sign  of abnormally increased radiotracer uptake. There  is specifically no uptake as intense or more intense than the delayed phase  bone scan uptake in the region of the right knee.    IMPRESSION:  Positive 3 phase bone scan with increased radiotracer uptake to the right  knee adjacent to the arthroplasty components. Clinical correlation for  loosening or postsurgical change/complication is recommended.    Infectious process is less favored, as there is no sign of abnormally  increased white blood cell uptake in the right knee.  Dictation workstation:   [id number]    Original Interpreting Physician:   SERENA SOTELO MD  Original Transcribed by/Date: MMODAL Jun 20 2023  7:05A  Original Electronically Signed by/Date: SERENA SOTELO MD Jun 21 2023 12:38P    Addendum Interpreting Physician:  Addendum Transcribed by/Date: NO ADDENDUM  Addendum Electronically Signed by/Date:      Assessment/Plan he is he is doing very well continuing outpatient physical therapy Staples removed and we will see him in 4 weeks  Encounter Diagnoses:  Status post revision of total replacement of right knee    Orders Placed This Encounter    Referral to Physical Therapy     No follow-ups on file.

## 2023-10-20 ENCOUNTER — HOME CARE VISIT (OUTPATIENT)
Dept: HOME HEALTH SERVICES | Facility: HOME HEALTH | Age: 63
End: 2023-10-20
Payer: COMMERCIAL

## 2023-10-20 VITALS
RESPIRATION RATE: 18 BRPM | TEMPERATURE: 97.6 F | OXYGEN SATURATION: 99 % | SYSTOLIC BLOOD PRESSURE: 120 MMHG | DIASTOLIC BLOOD PRESSURE: 64 MMHG | HEART RATE: 60 BPM

## 2023-10-20 PROCEDURE — G0151 HHCP-SERV OF PT,EA 15 MIN: HCPCS

## 2023-10-20 SDOH — HEALTH STABILITY: PHYSICAL HEALTH: EXERCISE COMMENTS: INDEPENDENT WITH FULL HEP PEFORMED TODAY SITTING, SUPINE, STANDING.

## 2023-10-20 ASSESSMENT — ACTIVITIES OF DAILY LIVING (ADL)
AMBULATION ASSISTANCE ON FLAT SURFACES: 1
OASIS_M1830: 00
HOME_HEALTH_OASIS: 00
BATHING_CURRENT_FUNCTION: INDEPENDENT
AMBULATION ASSISTANCE: 1
BATHING ASSESSED: 1
AMBULATION_DISTANCE/DURATION_TOLERATED: 200+
AMBULATION ASSISTANCE: INDEPENDENT

## 2023-10-20 ASSESSMENT — ENCOUNTER SYMPTOMS
OCCASIONAL FEELINGS OF UNSTEADINESS: 0
SUBJECTIVE PAIN PROGRESSION: RESOLVED
LOWEST PAIN SEVERITY IN PAST 24 HOURS: 0/10
LIMITED RANGE OF MOTION: 1
DENIES PAIN: 1
HIGHEST PAIN SEVERITY IN PAST 24 HOURS: 0/10

## 2023-10-24 ENCOUNTER — TELEPHONE (OUTPATIENT)
Dept: ORTHOPEDIC SURGERY | Facility: CLINIC | Age: 63
End: 2023-10-24
Payer: COMMERCIAL

## 2023-10-24 ENCOUNTER — APPOINTMENT (OUTPATIENT)
Dept: HOME HEALTH SERVICES | Facility: HOME HEALTH | Age: 63
End: 2023-10-24
Payer: COMMERCIAL

## 2023-10-24 NOTE — TELEPHONE ENCOUNTER
Message from patient's email: Can you ask Dr Griffiths if doing therapy 2 times a day everyday is OK. I just don't want to over do it.

## 2023-10-25 PROBLEM — M79.89 CALF SWELLING: Status: ACTIVE | Noted: 2023-10-25

## 2023-10-25 RX ORDER — INDOMETHACIN 50 MG/1
50 CAPSULE ORAL
COMMUNITY
Start: 2022-08-05

## 2023-10-25 RX ORDER — LANCETS
EACH MISCELLANEOUS
COMMUNITY

## 2023-10-25 RX ORDER — ALLOPURINOL 300 MG/1
300 TABLET ORAL
COMMUNITY
Start: 2014-12-01

## 2023-10-25 RX ORDER — BLOOD SUGAR DIAGNOSTIC
STRIP MISCELLANEOUS 2 TIMES DAILY
COMMUNITY
Start: 2021-12-14

## 2023-10-25 RX ORDER — CEPHALEXIN 500 MG/1
500 CAPSULE ORAL
COMMUNITY
Start: 2022-07-06

## 2023-10-25 RX ORDER — CHLORHEXIDINE GLUCONATE ORAL RINSE 1.2 MG/ML
15 SOLUTION DENTAL
COMMUNITY
Start: 2023-09-26

## 2023-10-25 RX ORDER — ACETAMINOPHEN 325 MG/1
650 TABLET ORAL EVERY 6 HOURS PRN
COMMUNITY

## 2023-10-25 RX ORDER — OMEPRAZOLE 40 MG/1
40 CAPSULE, DELAYED RELEASE ORAL
COMMUNITY
Start: 2014-12-05

## 2023-10-25 RX ORDER — ASPIRIN 81 MG/1
81 TABLET ORAL
COMMUNITY
Start: 2022-07-06

## 2023-10-26 ENCOUNTER — EVALUATION (OUTPATIENT)
Dept: PHYSICAL THERAPY | Facility: CLINIC | Age: 63
End: 2023-10-26
Payer: COMMERCIAL

## 2023-10-26 DIAGNOSIS — M25.661 STIFFNESS OF KNEE JOINT, RIGHT: Primary | ICD-10-CM

## 2023-10-26 DIAGNOSIS — Z96.651 STATUS POST REVISION OF TOTAL REPLACEMENT OF RIGHT KNEE: ICD-10-CM

## 2023-10-26 PROCEDURE — 97161 PT EVAL LOW COMPLEX 20 MIN: CPT | Mod: GP | Performed by: PHYSICAL THERAPIST

## 2023-10-26 ASSESSMENT — PAIN - FUNCTIONAL ASSESSMENT: PAIN_FUNCTIONAL_ASSESSMENT: 0-10

## 2023-10-26 ASSESSMENT — PAIN SCALES - GENERAL: PAINLEVEL_OUTOF10: 0 - NO PAIN

## 2023-10-26 NOTE — Clinical Note
October 26, 2023     Patient: Wesley Guevara   YOB: 1960   Date of Visit: 10/26/2023       To Whom it May Concern:    Wesley Guevara was seen in my clinic on 10/26/2023. He {Return to school/sport:67818}.    If you have any questions or concerns, please don't hesitate to call.         Sincerely,          Clinton David Butts, PT        CC: No Recipients

## 2023-10-26 NOTE — Clinical Note
October 26, 2023     Patient: Wesley Guevara   YOB: 1960   Date of Visit: 10/26/2023       To Whom It May Concern:    It is my medical opinion that Wesley Guevara {Work release (duty restriction):37609}.    If you have any questions or concerns, please don't hesitate to call.         Sincerely,        Clintonelvin Butts, PT    CC: No Recipients

## 2023-10-26 NOTE — PROGRESS NOTES
Physical Therapy    Physical Therapy Treatment    Patient Name: Wesley Guevara  MRN: [id number]  Today's Date: 10/26/2023         Assessment:   Patient is a 63 year old male s/p right knee TKA revision and debridement effecting his ability to walk, stand, negotiate stairs and perform ADLs. Patient will benefit from skilled PT services through the performance of therapeutic exercises, gait training, and NMR to improve his right knee strength and AROM to reach his previous and unrestricted functional levels.     Plan:   Initiate PT plan of care and progress as tolerated.     Current Problem  1. Stiffness of knee joint, right        2. Status post revision of total replacement of right knee  Referral to Physical Therapy          Subjective   General  Reason for Referral: Revision of TKA right knee  Referred By: Tunde  Past Medical History Relevant to Rehab: July 2022, TKA with significant issues and scar tissue with misalignment of the prosthetic  Patient had a right TKA in July of 2022. Significant issues and problems due to scar tissue and misalignment being caused the scar tissue. Patient had multiple tests and it was determined that he needed a revision. The revision was don October 2, and he has felt a significant improvement. Patient had home PT and was discharge 10/20/2023. Patient does the home exercises. Patient had significant pain and limitations. Patient works in . He is on his feet and walking and climbing onto equipment. Patient stated that the majority of the surgery was debridement surgery and re-alignment. Patient is on FMLA until 1/1/2024.  Precautions     Vital Signs     Pain  Pain Assessment: 0-10  Pain Score: 0 - No pain  Pain Type:  (after therapy soreness 3/10)    Objective   Cognition     Posture     Extremity/Trunk Assessment       KNEE                 Knee AROM WFL unless documented below  R knee flexion: (140°): 95  L knee flexion: (140°): 110  R knee extension: (0°):  Lacking 5 degrees from full extension  L knee extension: (0°): 0  Knee PROM WFL unless documented below     Knee MMT WFL unless documented below  R knee flexion: (5/5): 4-/5  L knee flexion: (5/5): 5/5  R knee extension: (5/5): 4+/5  L knee extension: (5/5): 5/5  DTR WFL unless documented below     Special Tests Negative unless documented below            Flexibility  R hamstrings: good  L hamstrings: good to normal  R hip flexors: good  L hip flexors: good  R quads: fair  L quads: fair to good  Flexibility Comment: right gastrocnemius good left normal    Outcome Measures:   LEFS 75    Treatments:     Activity:       OP EDUCATION:   Access Code: [email or web address]  URL: [email or web address]  Date: 10/26/2023  Prepared by: JUNO Browningord    Exercises  - Supine Hamstring Stretch with Strap  - 2 x daily - 7 x weekly - 1 sets - 3 reps - 30 seconds hold  - Supine Calf Stretch with Strap  - 2 x daily - 7 x weekly - 1 sets - 3 reps - 30 seconds hold  - Prone Terminal Knee Extension  - 2 x daily - 7 x weekly - 1-2 sets - 1 reps - 2-5 minutes hold  - Seated Hamstring Curl with Anchored Resistance (Mirrored)  - 1-2 x daily - 7 x weekly - 3 sets - 10 reps    Goals:  Active       PT Problem       Patient will increase tolerance to activity in standing for any desired duration in order to participate in ADL, IADL, work, and dashawn skills or activities.       Start:  10/26/23            Patient will increase right knee AROM to 0 degrees to > 110 degrees in order to participate in ADL, IADL, work, or leisure skills and activities.       Start:  10/26/23            Patient will increase right knee strength to 5/5 in order to participate in ADL, IADL, work, or leisure skills and activities.       Start:  10/26/23            Patient will improve walking tolerance to any desired duration with normalized gait mechanics in order to participate in ADL, IADL, work, or leisure skills and activities.       Start:  10/26/23             Patient will improve the outcome measure score of the LEFS to 80 for increased independence and ease with performance of ADL, IADL, work, or leisure activities.       Start:  10/26/23

## 2023-10-27 ENCOUNTER — APPOINTMENT (OUTPATIENT)
Dept: HOME HEALTH SERVICES | Facility: HOME HEALTH | Age: 63
End: 2023-10-27
Payer: COMMERCIAL

## 2023-10-31 ENCOUNTER — APPOINTMENT (OUTPATIENT)
Dept: PHYSICAL THERAPY | Facility: CLINIC | Age: 63
End: 2023-10-31
Payer: COMMERCIAL

## 2023-11-03 ENCOUNTER — TREATMENT (OUTPATIENT)
Dept: PHYSICAL THERAPY | Facility: CLINIC | Age: 63
End: 2023-11-03
Payer: COMMERCIAL

## 2023-11-03 DIAGNOSIS — M25.661 STIFFNESS OF KNEE JOINT, RIGHT: ICD-10-CM

## 2023-11-03 PROCEDURE — 97110 THERAPEUTIC EXERCISES: CPT | Mod: GP | Performed by: PHYSICAL THERAPIST

## 2023-11-03 ASSESSMENT — PAIN - FUNCTIONAL ASSESSMENT: PAIN_FUNCTIONAL_ASSESSMENT: 0-10

## 2023-11-03 ASSESSMENT — PAIN SCALES - GENERAL: PAINLEVEL_OUTOF10: 0 - NO PAIN

## 2023-11-03 NOTE — PROGRESS NOTES
"Physical Therapy    Physical Therapy Treatment    Patient Name: Wesley Guevara  MRN: [id number]  Today's Date: 11/3/2023         Assessment:   Patient with noted improvement in AROM of the right knee and was challenged by some of the therapeutic exercises, but no increased pain noted just fatigue.     Plan:   Progress PT plan of care as tolerated    Current Problem  1. Stiffness of knee joint, right  Follow Up In Physical Therapy          Subjective   General  Reason for Referral: Revision of TKA right knee  Referred By: Tnude  Past Medical History Relevant to Rehab: July 2022, TKA with significant issues and scar tissue with misalignment of the prosthetic  \"I am going up and down the stairs more normally, but down is still harder then up. I have been going without the cane now wherever I go. I have it in the car, but I have not used it much.\"   Precautions     Vital Signs     Pain  Pain Assessment: 0-10  Pain Score: 0 - No pain    Objective        Treatments:  Therapeutic Exercise  Therapeutic Exercise Performed: Yes  Therapeutic Exercise Activity 1: Scifit: seat 13, level 1, 5'  Therapeutic Exercise Activity 2: Standing at 8 inch step: knee flexion 3 x 20\"  Therapeutic Exercise Activity 3: 8 inch step hamstring stretch: 3 x 20\"  Therapeutic Exercise Activity 4: Step march: 10 x 2 8 inch step  Therapeutic Exercise Activity 5: 2 inch toe touch: Right on step 10 x 2  Therapeutic Exercise Activity 6: HS curl: 3# 10 x 3  Therapeutic Exercise Activity 7: Seated: SAQ with 3# and hip adduction: 10 x 3\" x 3  Therapeutic Exercise Activity 8: Heel slide with strap: 10 x 10\"  Activity:       OP EDUCATION:   Ice less to more PRN    Goals:  Active       PT Problem       Patient will increase tolerance to activity in standing for any desired duration in order to participate in ADL, IADL, work, and dashawn skills or activities. (Progressing)       Start:  10/26/23    Expected End:  12/06/23            Patient will increase right " knee AROM to 0 degrees to > 110 degrees in order to participate in ADL, IADL, work, or leisure skills and activities. (Progressing)       Start:  10/26/23    Expected End:  12/06/23            Patient will increase right knee strength to 5/5 in order to participate in ADL, IADL, work, or leisure skills and activities. (Progressing)       Start:  10/26/23    Expected End:  12/06/23            Patient will improve walking tolerance to any desired duration with normalized gait mechanics in order to participate in ADL, IADL, work, or leisure skills and activities. (Progressing)       Start:  10/26/23    Expected End:  12/06/23            Patient will improve the outcome measure score of the LEFS to 80 for increased independence and ease with performance of ADL, IADL, work, or leisure activities. (Progressing)       Start:  10/26/23    Expected End:  12/06/23

## 2023-11-09 ENCOUNTER — APPOINTMENT (OUTPATIENT)
Dept: PHYSICAL THERAPY | Facility: CLINIC | Age: 63
End: 2023-11-09
Payer: COMMERCIAL

## 2023-11-14 ENCOUNTER — OFFICE VISIT (OUTPATIENT)
Dept: ORTHOPEDIC SURGERY | Facility: CLINIC | Age: 63
End: 2023-11-14
Payer: COMMERCIAL

## 2023-11-14 VITALS — BODY MASS INDEX: 29.82 KG/M2 | HEIGHT: 73 IN | WEIGHT: 225 LBS

## 2023-11-14 DIAGNOSIS — Z96.651 STATUS POST REVISION OF TOTAL REPLACEMENT OF RIGHT KNEE: Primary | ICD-10-CM

## 2023-11-14 PROCEDURE — 3078F DIAST BP <80 MM HG: CPT | Performed by: ORTHOPAEDIC SURGERY

## 2023-11-14 PROCEDURE — 1036F TOBACCO NON-USER: CPT | Performed by: ORTHOPAEDIC SURGERY

## 2023-11-14 PROCEDURE — 4010F ACE/ARB THERAPY RXD/TAKEN: CPT | Performed by: ORTHOPAEDIC SURGERY

## 2023-11-14 PROCEDURE — 3044F HG A1C LEVEL LT 7.0%: CPT | Performed by: ORTHOPAEDIC SURGERY

## 2023-11-14 PROCEDURE — 99024 POSTOP FOLLOW-UP VISIT: CPT | Performed by: ORTHOPAEDIC SURGERY

## 2023-11-14 PROCEDURE — 3074F SYST BP LT 130 MM HG: CPT | Performed by: ORTHOPAEDIC SURGERY

## 2023-11-14 ASSESSMENT — PAIN - FUNCTIONAL ASSESSMENT: PAIN_FUNCTIONAL_ASSESSMENT: NO/DENIES PAIN

## 2023-11-14 ASSESSMENT — PAIN SCALES - GENERAL: PAINLEVEL_OUTOF10: 0 - NO PAIN

## 2023-11-14 NOTE — PROGRESS NOTES
Subjective    Patient ID: Trini Guevara is a 63 y.o. male.    Chief Complaint: Follow-up of the Right Hip (S/P TKA)     Last Surgery: Revision Right Total Knee Arthroplasty (depuy Attune Polys Tc3 Rp Stems, Sleeves) - Right  Last Surgery Date: 10/2/2023    HPI 6 weeks status post revision right knee doing very well    Objective   Ortho Exam range of motion is 3 to 100 degrees stable small amount of fluid neurovascular intact    Image Results:  NM bone whole body 3 phase  PROCEDURE:         BONE AND OR JOINT 3 PHASE - IN  3533  REASON FOR EXAM: Z96.651    RESULT: MRN: [id number]  Patient Name: TRINI GUEVARA    STUDY:  BONE AND OR JOINT 3 PHASE; LOC OF ABSCESS SINGLE AREA; 6/21/2023 12:16 pm    INDICATION:  Z96.651. 63-year-old man with history of right knee replacement in 2022,  with right knee swelling since the surgery. Patient reports warmth to the  touch, without fever, trauma, open wounds.    COMPARISON:  Knee radiographs 06/15/2023.    ACCESSION NUMBER(S):  [id number]; [id number]    ORDERING CLINICIAN:  ТАТЬЯНА ARRIOLA    TECHNIQUE:  Injected Dose: 27.5 mCi Tc-99m MDP. Three-phase bone scan was then performed  for the bilateral knees.    In addition, 428 microcuries indium 111 labeled white blood cells were  administered on 06/20/2023. Concurrent white blood cell scan was then  performed on 06/21/2020 23.    FINDINGS:  Bone scan:  Blood Flow: There is asymmetrically increased radiotracer uptake in the  right knee adjacent to the arthroplasty.  Blood Pool: There is asymmetrically increased radiotracer uptake in the  right knee adjacent to the arthroplasty.  Delayed: There is asymmetrically increased radiotracer uptake in the right  knee adjacent to the arthroplasty components.    Blood cell scan: No sign of abnormally increased radiotracer uptake. There  is specifically no uptake as intense or more intense than the delayed phase  bone scan uptake in the region of the right knee.    IMPRESSION:  Positive 3 phase  bone scan with increased radiotracer uptake to the right  knee adjacent to the arthroplasty components. Clinical correlation for  loosening or postsurgical change/complication is recommended.    Infectious process is less favored, as there is no sign of abnormally  increased white blood cell uptake in the right knee.  Dictation workstation:   [id number]    Original Interpreting Physician:   SERENA SOTELO MD  Original Transcribed by/Date: MMODAL Jun 20 2023  7:05A  Original Electronically Signed by/Date: SERENA SOTELO MD Jun 21 2023 12:38P    Addendum Interpreting Physician:  Addendum Transcribed by/Date: NO ADDENDUM  Addendum Electronically Signed by/Date:  NM tumor localization limited  PROCEDURE:         LOC OF ABSCESS SINGLE AREA - INM  3529  REASON FOR EXAM: Z96.651    RESULT: MRN: [id number]  Patient Name: TRINI CHENG    STUDY:  BONE AND OR JOINT 3 PHASE; LOC OF ABSCESS SINGLE AREA; 6/21/2023 12:16 pm    INDICATION:  Z96.651. 63-year-old man with history of right knee replacement in 2022,  with right knee swelling since the surgery. Patient reports warmth to the  touch, without fever, trauma, open wounds.    COMPARISON:  Knee radiographs 06/15/2023.    ACCESSION NUMBER(S):  [id number]; [id number]    ORDERING CLINICIAN:  ТАТЬЯНА ARRIOLA    TECHNIQUE:  Injected Dose: 27.5 mCi Tc-99m MDP. Three-phase bone scan was then performed  for the bilateral knees.    In addition, 428 microcuries indium 111 labeled white blood cells were  administered on 06/20/2023. Concurrent white blood cell scan was then  performed on 06/21/2020 23.    FINDINGS:  Bone scan:  Blood Flow: There is asymmetrically increased radiotracer uptake in the  right knee adjacent to the arthroplasty.  Blood Pool: There is asymmetrically increased radiotracer uptake in the  right knee adjacent to the arthroplasty.  Delayed: There is asymmetrically increased radiotracer uptake in the right  knee adjacent to the arthroplasty components.    Blood cell  scan: No sign of abnormally increased radiotracer uptake. There  is specifically no uptake as intense or more intense than the delayed phase  bone scan uptake in the region of the right knee.    IMPRESSION:  Positive 3 phase bone scan with increased radiotracer uptake to the right  knee adjacent to the arthroplasty components. Clinical correlation for  loosening or postsurgical change/complication is recommended.    Infectious process is less favored, as there is no sign of abnormally  increased white blood cell uptake in the right knee.  Dictation workstation:   [id number]    Original Interpreting Physician:   SERENA SOTELO MD  Original Transcribed by/Date: MMODAL Jun 20 2023  7:05A  Original Electronically Signed by/Date: SERENA SOTELO MD Jun 21 2023 12:38P    Addendum Interpreting Physician:  Addendum Transcribed by/Date: NO ADDENDUM  Addendum Electronically Signed by/Date:      Assessment/Plan doing very well continuing physical therapy is here in 6 weeks  Encounter Diagnoses:  Status post revision of total replacement of right knee    No orders of the defined types were placed in this encounter.    No follow-ups on file.

## 2023-11-16 ENCOUNTER — TREATMENT (OUTPATIENT)
Dept: PHYSICAL THERAPY | Facility: CLINIC | Age: 63
End: 2023-11-16
Payer: COMMERCIAL

## 2023-11-16 DIAGNOSIS — M25.661 STIFFNESS OF KNEE JOINT, RIGHT: ICD-10-CM

## 2023-11-16 PROCEDURE — 97110 THERAPEUTIC EXERCISES: CPT | Mod: GP | Performed by: PHYSICAL THERAPIST

## 2023-11-16 ASSESSMENT — PAIN SCALES - GENERAL: PAINLEVEL_OUTOF10: 0 - NO PAIN

## 2023-11-16 ASSESSMENT — PAIN - FUNCTIONAL ASSESSMENT: PAIN_FUNCTIONAL_ASSESSMENT: 0-10

## 2023-11-16 NOTE — PROGRESS NOTES
"Physical Therapy    Physical Therapy Treatment    Patient Name: Wesley Guevara  MRN: [id number]  Today's Date: 11/16/2023         Assessment:    Patient is progressing well with improved AROM measurements and overall functional ability. Patient will utilized the increase in therapeutic exercise intensity over the next few visits to reach his long term goals.   Plan:   Progress as tolerated    Current Problem  1. Stiffness of knee joint, right  Follow Up In Physical Therapy          General  PT  Visit  Response to Previous Treatment: Patient with no complaints from previous session.  General  Reason for Referral: Revision of TKA right knee  Referred By: Tunde  Past Medical History Relevant to Rehab: July 2022, TKA with significant issues and scar tissue with misalignment of the prosthetic  Patient stated: \"I am feeling really good. I saw the doctor and he said he is happy with it and to come back in 6 weeks. I hope he releases me to get back to work.\"     Subjective    Precautions     Vital Signs     Pain  Pain Assessment  Pain Assessment: 0-10  Pain Score: 0 - No pain    Objective   KNEE  Right knee AROM 3 degrees from full extension to 112 degrees flexion.   Knee MMT WFL unless documented below     Treatments:  Therapeutic Exercise  Therapeutic Exercise Performed: Yes  Therapeutic Exercise Activity 1: Scifit: seat 13, level 2, 6'  Therapeutic Exercise Activity 2: squats: 10 x 3  Therapeutic Exercise Activity 3: 19 inch Right LE step up: 10 x  Therapeutic Exercise Activity 4: Step march: 8 inch step: 20x R  Therapeutic Exercise Activity 5: 5 inch heel touch: Right on TM 10 x 2  Therapeutic Exercise Activity 6: HS Curl: 4# 10 x 3  Therapeutic Exercise Activity 7: Seated: LAQ 4# 15 x 2  Therapeutic Exercise Activity 8: side steps with mini squat position: 2 laps  Therapeutic Exercise Activity 9: March: 2 laps  Therapeutic Exercise Activity 10: Retro walk: 2 laps    OP EDUCATION:   Elevation and continued progression " of activity at home for advancement of strength, proprioception, and functional activity tolerance and elevation as needed for edema control     Goals:  Active       PT Problem       Patient will increase tolerance to activity in standing for any desired duration in order to participate in ADL, IADL, work, and dashawn skills or activities. (Progressing)       Start:  10/26/23    Expected End:  12/06/23            Patient will increase right knee AROM to 0 degrees to > 110 degrees in order to participate in ADL, IADL, work, or leisure skills and activities. (Progressing)       Start:  10/26/23    Expected End:  12/06/23            Patient will increase right knee strength to 5/5 in order to participate in ADL, IADL, work, or leisure skills and activities. (Progressing)       Start:  10/26/23    Expected End:  12/06/23            Patient will improve walking tolerance to any desired duration with normalized gait mechanics in order to participate in ADL, IADL, work, or leisure skills and activities. (Progressing)       Start:  10/26/23    Expected End:  12/06/23            Patient will improve the outcome measure score of the LEFS to 80 for increased independence and ease with performance of ADL, IADL, work, or leisure activities. (Progressing)       Start:  10/26/23    Expected End:  12/06/23

## 2023-11-22 ENCOUNTER — TREATMENT (OUTPATIENT)
Dept: PHYSICAL THERAPY | Facility: CLINIC | Age: 63
End: 2023-11-22
Payer: COMMERCIAL

## 2023-11-22 DIAGNOSIS — M25.661 STIFFNESS OF KNEE JOINT, RIGHT: ICD-10-CM

## 2023-11-22 PROCEDURE — 97110 THERAPEUTIC EXERCISES: CPT | Mod: GP | Performed by: PHYSICAL THERAPIST

## 2023-11-22 ASSESSMENT — PAIN SCALES - GENERAL: PAINLEVEL_OUTOF10: 0 - NO PAIN

## 2023-11-22 ASSESSMENT — PAIN - FUNCTIONAL ASSESSMENT: PAIN_FUNCTIONAL_ASSESSMENT: 0-10

## 2023-11-22 NOTE — PROGRESS NOTES
"Physical Therapy    Physical Therapy Treatment    Patient Name: Wesley Guevara  MRN: [id number]  Today's Date: 11/22/2023         Assessment:   Patient is progressing towards full recovery and is preparing for full return to previous functional levels without restrictions.   Plan:   Progress as tolerated    Current Problem  1. Stiffness of knee joint, right  Follow Up In Physical Therapy          General  PT  Visit  Response to Previous Treatment: Patient with no complaints from previous session.  General  Reason for Referral: Revision of TKA right knee  Referred By: Tunde  Past Medical History Relevant to Rehab: July 2022, TKA with significant issues and scar tissue with misalignment of the prosthetic  \"I am doing good. I am tired of doing the same old thing.\"   Subjective    Precautions     Vital Signs     Pain  Pain Assessment  Pain Assessment: 0-10  Pain Score: 0 - No pain    Objective      Treatments:  Therapeutic Exercise  Therapeutic Exercise Performed: Yes  Therapeutic Exercise Activity 1: Scifit: seat 13, level 2, 6'  Therapeutic Exercise Activity 2: Retro TM 3' 1.0 mph  Therapeutic Exercise Activity 3: Large box Right LE step up: 10 x 2  Therapeutic Exercise Activity 4: Step march: To trampoline 20x R  Therapeutic Exercise Activity 5: SLS on Trampoline 4 x 20\"  Therapeutic Exercise Activity 6: Squats on trampoline; 10 x 2  Therapeutic Exercise Activity 8: side steps with mini squat position: 2 laps  Therapeutic Exercise Activity 9: March: 3 laps    OP EDUCATION:       Goals:  Active       PT Problem       Patient will increase tolerance to activity in standing for any desired duration in order to participate in ADL, IADL, work, and dashawn skills or activities. (Progressing)       Start:  10/26/23    Expected End:  12/06/23            Patient will increase right knee AROM to 0 degrees to > 110 degrees in order to participate in ADL, IADL, work, or leisure skills and activities. (Progressing)       Start:  " 10/26/23    Expected End:  12/06/23            Patient will increase right knee strength to 5/5 in order to participate in ADL, IADL, work, or leisure skills and activities. (Progressing)       Start:  10/26/23    Expected End:  12/06/23            Patient will improve walking tolerance to any desired duration with normalized gait mechanics in order to participate in ADL, IADL, work, or leisure skills and activities. (Progressing)       Start:  10/26/23    Expected End:  12/06/23            Patient will improve the outcome measure score of the LEFS to 80 for increased independence and ease with performance of ADL, IADL, work, or leisure activities. (Progressing)       Start:  10/26/23    Expected End:  12/06/23

## 2023-11-28 ENCOUNTER — APPOINTMENT (OUTPATIENT)
Dept: PRIMARY CARE | Facility: CLINIC | Age: 63
End: 2023-11-28
Payer: COMMERCIAL

## 2023-11-30 ENCOUNTER — APPOINTMENT (OUTPATIENT)
Dept: PHYSICAL THERAPY | Facility: CLINIC | Age: 63
End: 2023-11-30
Payer: COMMERCIAL

## 2023-12-08 ENCOUNTER — APPOINTMENT (OUTPATIENT)
Dept: PRIMARY CARE | Facility: CLINIC | Age: 63
End: 2023-12-08
Payer: COMMERCIAL

## 2023-12-12 ENCOUNTER — APPOINTMENT (OUTPATIENT)
Dept: PHYSICAL THERAPY | Facility: CLINIC | Age: 63
End: 2023-12-12
Payer: COMMERCIAL

## 2023-12-19 ENCOUNTER — TELEPHONE (OUTPATIENT)
Dept: PRIMARY CARE | Facility: CLINIC | Age: 63
End: 2023-12-19

## 2023-12-19 ENCOUNTER — OFFICE VISIT (OUTPATIENT)
Dept: ORTHOPEDIC SURGERY | Facility: CLINIC | Age: 63
End: 2023-12-19
Payer: COMMERCIAL

## 2023-12-19 DIAGNOSIS — I10 ESSENTIAL HYPERTENSION: Primary | ICD-10-CM

## 2023-12-19 DIAGNOSIS — Z96.651 STATUS POST REVISION OF TOTAL REPLACEMENT OF RIGHT KNEE: Primary | ICD-10-CM

## 2023-12-19 PROCEDURE — 4010F ACE/ARB THERAPY RXD/TAKEN: CPT | Performed by: ORTHOPAEDIC SURGERY

## 2023-12-19 PROCEDURE — 3044F HG A1C LEVEL LT 7.0%: CPT | Performed by: ORTHOPAEDIC SURGERY

## 2023-12-19 PROCEDURE — 1036F TOBACCO NON-USER: CPT | Performed by: ORTHOPAEDIC SURGERY

## 2023-12-19 PROCEDURE — 99024 POSTOP FOLLOW-UP VISIT: CPT | Performed by: ORTHOPAEDIC SURGERY

## 2023-12-19 RX ORDER — LISINOPRIL 10 MG/1
10 TABLET ORAL DAILY
Qty: 30 TABLET | Refills: 5 | Status: SHIPPED | OUTPATIENT
Start: 2023-12-19 | End: 2024-03-25

## 2023-12-19 NOTE — LETTER
December 19, 2023     Patient: Wesley Guevara   YOB: 1960   Date of Visit: 12/19/2023       To Whom It May Concern:    It is my medical opinion that Wesley Guevara can be released to full duty 01/02/2024.     If you have any questions or concerns, please don't hesitate to call.         Sincerely,        Nasim Griffiths MD    CC: No Recipients

## 2023-12-19 NOTE — TELEPHONE ENCOUNTER
Pt called requesting refill on lisinopril 10 mg    Called pt to confirm if taking only lisinopril 10 mg or both lisinopril 10 mg and hydrochlorothiazide 12.5 mg    Pt stated only taking lisinopril 10 mg and [facility].    Last office visit: 9/20/2023

## 2023-12-19 NOTE — PROGRESS NOTES
Subjective    Patient ID: Trini Guevara is a 63 y.o. male.    Chief Complaint: Follow-up of the Right Knee     Last Surgery: Revision Right Total Knee Arthroplasty (depuy Attune Polys Tc3 Rp Stems, Sleeves) - Right  Last Surgery Date: 10/2/2023    HPI he is close to 3 months after his revision he is doing terrific    Objective   Ortho Exam range of motion is 315 degrees excellent stability no effusion no distal edema walking well good strength    Image Results:  NM bone whole body 3 phase  PROCEDURE:         BONE AND OR JOINT 3 PHASE - IN  3533  REASON FOR EXAM: Z96.651    RESULT: MRN: [id number]  Patient Name: TRINI GUEVARA    STUDY:  BONE AND OR JOINT 3 PHASE; LOC OF ABSCESS SINGLE AREA; 6/21/2023 12:16 pm    INDICATION:  Z96.651. 63-year-old man with history of right knee replacement in 2022,  with right knee swelling since the surgery. Patient reports warmth to the  touch, without fever, trauma, open wounds.    COMPARISON:  Knee radiographs 06/15/2023.    ACCESSION NUMBER(S):  [id number]; [id number]    ORDERING CLINICIAN:  ТАТЬЯНА ARRIOLA    TECHNIQUE:  Injected Dose: 27.5 mCi Tc-99m MDP. Three-phase bone scan was then performed  for the bilateral knees.    In addition, 428 microcuries indium 111 labeled white blood cells were  administered on 06/20/2023. Concurrent white blood cell scan was then  performed on 06/21/2020 23.    FINDINGS:  Bone scan:  Blood Flow: There is asymmetrically increased radiotracer uptake in the  right knee adjacent to the arthroplasty.  Blood Pool: There is asymmetrically increased radiotracer uptake in the  right knee adjacent to the arthroplasty.  Delayed: There is asymmetrically increased radiotracer uptake in the right  knee adjacent to the arthroplasty components.    Blood cell scan: No sign of abnormally increased radiotracer uptake. There  is specifically no uptake as intense or more intense than the delayed phase  bone scan uptake in the region of the right  knee.    IMPRESSION:  Positive 3 phase bone scan with increased radiotracer uptake to the right  knee adjacent to the arthroplasty components. Clinical correlation for  loosening or postsurgical change/complication is recommended.    Infectious process is less favored, as there is no sign of abnormally  increased white blood cell uptake in the right knee.  Dictation workstation:   [id number]    Original Interpreting Physician:   SERENA SOTELO MD  Original Transcribed by/Date: MMODAL Jun 20 2023  7:05A  Original Electronically Signed by/Date: SERENA SOTELO MD Jun 21 2023 12:38P    Addendum Interpreting Physician:  Addendum Transcribed by/Date: NO ADDENDUM  Addendum Electronically Signed by/Date:  NM tumor localization limited  PROCEDURE:         LOC OF ABSCESS SINGLE AREA - INM  3529  REASON FOR EXAM: Z96.651    RESULT: MRN: [id number]  Patient Name: TRINI CHENG    STUDY:  BONE AND OR JOINT 3 PHASE; LOC OF ABSCESS SINGLE AREA; 6/21/2023 12:16 pm    INDICATION:  Z96.651. 63-year-old man with history of right knee replacement in 2022,  with right knee swelling since the surgery. Patient reports warmth to the  touch, without fever, trauma, open wounds.    COMPARISON:  Knee radiographs 06/15/2023.    ACCESSION NUMBER(S):  [id number]; [id number]    ORDERING CLINICIAN:  ТАТЬЯНА ARRIOLA    TECHNIQUE:  Injected Dose: 27.5 mCi Tc-99m MDP. Three-phase bone scan was then performed  for the bilateral knees.    In addition, 428 microcuries indium 111 labeled white blood cells were  administered on 06/20/2023. Concurrent white blood cell scan was then  performed on 06/21/2020 23.    FINDINGS:  Bone scan:  Blood Flow: There is asymmetrically increased radiotracer uptake in the  right knee adjacent to the arthroplasty.  Blood Pool: There is asymmetrically increased radiotracer uptake in the  right knee adjacent to the arthroplasty.  Delayed: There is asymmetrically increased radiotracer uptake in the right  knee adjacent to the  arthroplasty components.    Blood cell scan: No sign of abnormally increased radiotracer uptake. There  is specifically no uptake as intense or more intense than the delayed phase  bone scan uptake in the region of the right knee.    IMPRESSION:  Positive 3 phase bone scan with increased radiotracer uptake to the right  knee adjacent to the arthroplasty components. Clinical correlation for  loosening or postsurgical change/complication is recommended.    Infectious process is less favored, as there is no sign of abnormally  increased white blood cell uptake in the right knee.  Dictation workstation:   [id number]    Original Interpreting Physician:   SERENA SOTELO MD  Original Transcribed by/Date: MMODAL Jun 20 2023  7:05A  Original Electronically Signed by/Date: SERENA SOTELO MD Jun 21 2023 12:38P    Addendum Interpreting Physician:  Addendum Transcribed by/Date: NO ADDENDUM  Addendum Electronically Signed by/Date:      Assessment/Plan at this point we will discontinue doing his own exercises and follow-up as needed  Encounter Diagnoses:  Status post revision of total replacement of right knee    No orders of the defined types were placed in this encounter.    No follow-ups on file.

## 2024-01-09 ENCOUNTER — DOCUMENTATION (OUTPATIENT)
Dept: PHYSICAL THERAPY | Facility: HOSPITAL | Age: 64
End: 2024-01-09
Payer: COMMERCIAL

## 2024-01-09 NOTE — PROGRESS NOTES
Physical Therapy    Discharge Summary    Name: Wesley Guevara  MRN: [id number]  : 1960  Date: 24    Discharge Summary: PT    Discharge Information: Date of discharge 2024, Date of last visit 2023, Date of evaluation 10/26/2023, Number of attended visits 4, Referred by Walker, and Referred for S/P right knee TKA revision    Therapy Summary: PT services helped address this patient's right knee stiffness and loss of strength through the performance of therapeutic exercises.     Discharge Status: Due to patient not following up for formal discharge summary, patient's current status is unknown     Rehab Discharge Reason: Failed to schedule and/or keep follow-up appointment(s)

## 2024-02-07 ENCOUNTER — APPOINTMENT (OUTPATIENT)
Dept: PODIATRY | Facility: CLINIC | Age: 64
End: 2024-02-07
Payer: COMMERCIAL

## 2024-02-28 ENCOUNTER — APPOINTMENT (OUTPATIENT)
Dept: PODIATRY | Facility: CLINIC | Age: 64
End: 2024-02-28
Payer: COMMERCIAL

## 2024-05-22 DIAGNOSIS — I10 ESSENTIAL HYPERTENSION: ICD-10-CM

## 2024-05-22 DIAGNOSIS — E78.49 OTHER HYPERLIPIDEMIA: ICD-10-CM

## 2024-05-23 RX ORDER — LISINOPRIL 10 MG/1
10 TABLET ORAL DAILY
Qty: 90 TABLET | Refills: 1 | Status: SHIPPED | OUTPATIENT
Start: 2024-05-23 | End: 2024-11-19

## 2024-05-23 RX ORDER — ROSUVASTATIN CALCIUM 10 MG/1
10 TABLET, COATED ORAL DAILY
Qty: 90 TABLET | Refills: 1 | Status: SHIPPED | OUTPATIENT
Start: 2024-05-23 | End: 2024-11-19

## 2024-06-06 ENCOUNTER — HOSPITAL ENCOUNTER (OUTPATIENT)
Dept: RADIOLOGY | Facility: CLINIC | Age: 64
Discharge: HOME | End: 2024-06-06
Payer: COMMERCIAL

## 2024-06-06 ENCOUNTER — OFFICE VISIT (OUTPATIENT)
Dept: ORTHOPEDIC SURGERY | Facility: CLINIC | Age: 64
End: 2024-06-06
Payer: COMMERCIAL

## 2024-06-06 DIAGNOSIS — M17.11 ARTHRITIS OF RIGHT KNEE: ICD-10-CM

## 2024-06-06 PROCEDURE — 4010F ACE/ARB THERAPY RXD/TAKEN: CPT | Performed by: ORTHOPAEDIC SURGERY

## 2024-06-06 PROCEDURE — 99213 OFFICE O/P EST LOW 20 MIN: CPT | Performed by: ORTHOPAEDIC SURGERY

## 2024-06-06 PROCEDURE — 73564 X-RAY EXAM KNEE 4 OR MORE: CPT | Mod: RT

## 2024-06-06 PROCEDURE — 1036F TOBACCO NON-USER: CPT | Performed by: ORTHOPAEDIC SURGERY

## 2024-06-06 NOTE — PROGRESS NOTES
"This is a consultation from VALENTE Malone-CNP for No chief complaint on file.      This is a 64 y.o. male who presents for evaluation of his right knee.  Patient had right total knee couple years ago with another surgeon.  He had revision this past fall which she states \"scar tissue was removed\".  He has been doing okay since then, he has no significant complaints with the right knee.  No drainage from his incision no fevers no chills.  He walks without difficulty does not have a lot of pain.    Physical Exam    There has been no interval change in this patient's past medical, surgical, medications, allergies, family history or social history since the most recent visit to a provider within our department. 14 point review of systems was performed, reviewed, and negative except for pertinent positives documented in the history of present illness.     Constitutional: well developed, well nourished male in no acute distress  Psychiatric: normal mood, appropriate affect  Eyes: sclera anicteric  HENT: normocephalic/atraumatic  CV: regular rate and rhythm   Respiratory: non labored breathing  Integumentary: no rash  Neurological: moves all extremities    Right knee examination: Well-healed surgical incision erythema no drainage stable to varus and valgus stress range of motion 0-1 20 degrees neurovascular intact distally    Xrays were ordered by me, they were reviewed and independently interpreted by me today, they show stable right total knee arthroplasty no fracture dislocation or loosening  Procedures      Impression/Plan: This is a 64 y.o. male status post revision right total knee doing well overall.  Weight-bear as tolerated activity as tolerated routine radiographic follow-up    BMI Readings from Last 1 Encounters:   11/14/23 29.69 kg/m²      Lab Results   Component Value Date    CREATININE 0.80 10/03/2023     Tobacco Use: Low Risk  (6/6/2024)    Patient History     Smoking Tobacco Use: Never     " "Smokeless Tobacco Use: Never     Passive Exposure: Not on file      Computed MELD 3.0 unavailable. One or more values for this score either were not found within the given timeframe or did not fit some other criterion.  Computed MELD-Na unavailable. One or more values for this score either were not found within the given timeframe or did not fit some other criterion.       Lab Results   Component Value Date    HGBA1C 6.4 (H) 09/19/2023     No results found for: \"STAPHMRSASCR\"  "

## 2024-06-06 NOTE — LETTER
"June 6, 2024     PETR Munoz  [address]  [facility], [address]  [facility] [address]    Patient: Wesley Guevara   YOB: 1960   Date of Visit: 6/6/2024       Dear PETR Malone:    Thank you for referring Wesley Guevara to me for evaluation. Below are my notes for this consultation.  If you have questions, please do not hesitate to call me. I look forward to following your patient along with you.       Sincerely,     Jonny Dowling MD      CC: No Recipients  ______________________________________________________________________________________    This is a consultation from PETR Malone for No chief complaint on file.      This is a 64 y.o. male who presents for evaluation of his right knee.  Patient had right total knee couple years ago with another surgeon.  He had revision this past fall which she states \"scar tissue was removed\".  He has been doing okay since then, he has no significant complaints with the right knee.  No drainage from his incision no fevers no chills.  He walks without difficulty does not have a lot of pain.    Physical Exam    There has been no interval change in this patient's past medical, surgical, medications, allergies, family history or social history since the most recent visit to a provider within our department. 14 point review of systems was performed, reviewed, and negative except for pertinent positives documented in the history of present illness.     Constitutional: well developed, well nourished male in no acute distress  Psychiatric: normal mood, appropriate affect  Eyes: sclera anicteric  HENT: normocephalic/atraumatic  CV: regular rate and rhythm   Respiratory: non labored breathing  Integumentary: no rash  Neurological: moves all extremities    Right knee examination: Well-healed surgical incision erythema no drainage stable to varus and valgus stress range of motion 0-1 20 degrees neurovascular intact " "distally    Xrays were ordered by me, they were reviewed and independently interpreted by me today, they show stable right total knee arthroplasty no fracture dislocation or loosening  Procedures      Impression/Plan: This is a 64 y.o. male status post revision right total knee doing well overall.  Weight-bear as tolerated activity as tolerated routine radiographic follow-up    BMI Readings from Last 1 Encounters:   11/14/23 29.69 kg/m²      Lab Results   Component Value Date    CREATININE 0.80 10/03/2023     Tobacco Use: Low Risk  (6/6/2024)    Patient History    • Smoking Tobacco Use: Never    • Smokeless Tobacco Use: Never    • Passive Exposure: Not on file      Computed MELD 3.0 unavailable. One or more values for this score either were not found within the given timeframe or did not fit some other criterion.  Computed MELD-Na unavailable. One or more values for this score either were not found within the given timeframe or did not fit some other criterion.       Lab Results   Component Value Date    HGBA1C 6.4 (H) 09/19/2023     No results found for: \"STAPHMRSASCR\"  "

## 2024-06-21 ENCOUNTER — TELEPHONE (OUTPATIENT)
Dept: PRIMARY CARE | Facility: CLINIC | Age: 64
End: 2024-06-21
Payer: COMMERCIAL

## 2024-06-21 DIAGNOSIS — I10 ESSENTIAL HYPERTENSION: ICD-10-CM

## 2024-06-21 RX ORDER — LISINOPRIL 10 MG/1
10 TABLET ORAL DAILY
Qty: 30 TABLET | Refills: 0 | Status: SHIPPED | OUTPATIENT
Start: 2024-06-21 | End: 2024-07-21

## 2024-06-21 NOTE — TELEPHONE ENCOUNTER
Refill request on Lisinopril     Last office visit: 9/20/2023  Next office visit: Visit date not found

## 2024-08-06 ENCOUNTER — APPOINTMENT (OUTPATIENT)
Dept: PRIMARY CARE | Facility: CLINIC | Age: 64
End: 2024-08-06
Payer: COMMERCIAL

## 2024-12-31 ENCOUNTER — APPOINTMENT (OUTPATIENT)
Dept: ORTHOPEDIC SURGERY | Facility: CLINIC | Age: 64
End: 2024-12-31
Payer: COMMERCIAL

## 2025-01-03 ENCOUNTER — APPOINTMENT (OUTPATIENT)
Dept: ORTHOPEDIC SURGERY | Facility: CLINIC | Age: 65
End: 2025-01-03
Payer: COMMERCIAL

## 2025-01-23 ENCOUNTER — APPOINTMENT (OUTPATIENT)
Dept: ORTHOPEDIC SURGERY | Facility: CLINIC | Age: 65
End: 2025-01-23
Payer: COMMERCIAL

## 2025-02-13 ENCOUNTER — APPOINTMENT (OUTPATIENT)
Dept: ORTHOPEDIC SURGERY | Facility: CLINIC | Age: 65
End: 2025-02-13
Payer: COMMERCIAL

## 2025-02-13 ENCOUNTER — OFFICE VISIT (OUTPATIENT)
Dept: ORTHOPEDIC SURGERY | Facility: CLINIC | Age: 65
End: 2025-02-13
Payer: COMMERCIAL

## 2025-02-13 ENCOUNTER — HOSPITAL ENCOUNTER (OUTPATIENT)
Dept: RADIOLOGY | Facility: CLINIC | Age: 65
Discharge: HOME | End: 2025-02-13
Payer: COMMERCIAL

## 2025-02-13 VITALS — HEIGHT: 72 IN | BODY MASS INDEX: 31.29 KG/M2 | WEIGHT: 231 LBS

## 2025-02-13 DIAGNOSIS — R22.32 MASS OF LEFT WRIST: ICD-10-CM

## 2025-02-13 DIAGNOSIS — M19.039 WRIST ARTHRITIS: Primary | ICD-10-CM

## 2025-02-13 PROCEDURE — 99214 OFFICE O/P EST MOD 30 MIN: CPT | Performed by: ORTHOPAEDIC SURGERY

## 2025-02-13 PROCEDURE — 73110 X-RAY EXAM OF WRIST: CPT | Mod: LT

## 2025-02-13 RX ORDER — MELOXICAM 15 MG/1
15 TABLET ORAL DAILY
Qty: 30 TABLET | Refills: 0 | Status: SHIPPED | OUTPATIENT
Start: 2025-02-13 | End: 2025-03-15

## 2025-02-13 ASSESSMENT — PAIN DESCRIPTION - DESCRIPTORS: DESCRIPTORS: BURNING;THROBBING;SORE

## 2025-02-13 ASSESSMENT — ENCOUNTER SYMPTOMS
DEPRESSION: 0
OCCASIONAL FEELINGS OF UNSTEADINESS: 0
LOSS OF SENSATION IN FEET: 0

## 2025-02-13 ASSESSMENT — PAIN SCALES - GENERAL: PAINLEVEL_OUTOF10: 7

## 2025-02-13 ASSESSMENT — PAIN - FUNCTIONAL ASSESSMENT: PAIN_FUNCTIONAL_ASSESSMENT: 0-10

## 2025-02-14 NOTE — PROGRESS NOTES
History of Present Illness:  Chief Complaint   Patient presents with    Left Wrist - Mass     64-year-old male presents for evaluation of left ulnar-sided wrist pain and associated mass.  He first noticed the fullness about the ulnar aspect of his wrist about 1 month ago.  It has fluctuated in size.  He does not recall any injury.  He has had pain about the ulnar aspect of his wrist that is worse with increased activities and somewhat better with rest.  No numbness or tingling.  Pain is also worse with direct pressure.  Over-the-counter anti-inflammatories somewhat helpful.  Patient does have reported allergy to cortisone.    Past Medical History:   Diagnosis Date    Diabetes (Multi)     Hypertension        Medication Documentation Review Audit       Reviewed by Yenifer Greco MA (Medical Assistant) on 02/13/25 at 1509      Medication Order Taking? Sig Documenting Provider Last Dose Status   acetaminophen (Tylenol) 325 mg tablet [id number]  Take 2 tablets (650 mg) by mouth every 6 hours if needed. Historical Provider, MD  Active   allopurinol (Zyloprim) 300 mg tablet [id number]  Take 1 tablet (300 mg) by mouth. Historical Provider, MD  Active   aspirin 81 mg EC tablet [id number]  Take 1 tablet (81 mg) by mouth. Historical Provider, MD  Active   blood sugar diagnostic (Accu-Chek Guide test strips) strip [id number]  2 times a day. Historical Provider, MD  Active   cephalexin (Keflex) 500 mg capsule [id number]  Take 1 capsule (500 mg) by mouth. Historical Provider, MD  Active   chlorhexidine (Peridex) 0.12 % solution [id number]  Use 15 mL in the mouth or throat. NIGHT BEFORE SURGERY AND IN THE MORNING AND BEFORE SURGERY Historical Provider, MD  Active   indomethacin (Indocin) 50 mg capsule [id number]  1 capsule (50 mg) 3 times a day with meals. Historical Provider, MD  Active   lancets (Accu-Chek Softclix Lancets) misc [id number]   Historical Provider, MD  Active   lisinopril 10 mg tablet [id number]  TAKE 1 TABLET(10 MG) BY  MOUTH DAILY, PLEASE SCHEDULE APPOINTMENT FOR FURTHER REFILLS Felipa Duran APRSHAI-CNP  Active   metFORMIN  mg 24 hr tablet [id number] No Take 1 tablet (500 mg) by mouth 2 times a day with meals. Felipa Duran APRJRCNP Past Week  24 235   omeprazole (PriLOSEC) 40 mg DR capsule [id number]  Take 1 capsule (40 mg) by mouth. Historical Provider, MD  Active   rosuvastatin (Crestor) 10 mg tablet [id number]  Take 1 tablet (10 mg) by mouth once daily. Felpia Duran APRSHAIBARBARA   24 235                    Allergies   Allergen Reactions    Cortisone Headache, Other, Unknown and Swelling       Social History     Socioeconomic History    Marital status:      Spouse name: Not on file    Number of children: Not on file    Years of education: Not on file    Highest education level: Not on file   Occupational History    Not on file   Tobacco Use    Smoking status: Never    Smokeless tobacco: Never   Vaping Use    Vaping status: Never Used   Substance and Sexual Activity    Alcohol use: Never    Drug use: Never    Sexual activity: Not on file   Other Topics Concern    Not on file   Social History Narrative    Not on file     Social Drivers of Health     Financial Resource Strain: Low Risk  (10/2/2023)    Overall Financial Resource Strain (CARDIA)     Difficulty of Paying Living Expenses: Not hard at all   Food Insecurity: No Food Insecurity (10/2/2023)    Hunger Vital Sign     Worried About Running Out of Food in the Last Year: Never true     Ran Out of Food in the Last Year: Never true   Transportation Needs: No Transportation Needs (10/20/2023)    OASIS : Transportation     Lack of Transportation (Medical): No     Lack of Transportation (Non-Medical): No     Patient Unable or Declines to Respond: No   Physical Activity: Inactive (10/2/2023)    Exercise Vital Sign     Days of Exercise per Week: 0 days     Minutes of Exercise per Session: 0 min   Stress: No Stress Concern Present (10/2/2023)     [facility] - Occupational Stress Questionnaire     Feeling of Stress : Not at all   Social Connections: Feeling Socially Integrated (10/20/2023)    OASIS : Social Isolation     Frequency of experiencing loneliness or isolation: Never   Intimate Partner Violence: Not At Risk (10/2/2023)    Humiliation, Afraid, Rape, and Kick questionnaire     Fear of Current or Ex-Partner: No     Emotionally Abused: No     Physically Abused: No     Sexually Abused: No   Housing Stability: Low Risk  (10/2/2023)    Housing Stability Vital Sign     Unable to Pay for Housing in the Last Year: No     Number of Places Lived in the Last Year: 1     Unstable Housing in the Last Year: No       Past Surgical History:   Procedure Laterality Date    KNEE ARTHROPLASTY Right         Review of Systems   GENERAL: Negative for malaise, significant weight loss, fever  MUSCULOSKELETAL: see HPI  NEURO:  Negative     Physical Examination  Constitutional: Appears well-developed and well-nourished.  Head: Normocephalic and atraumatic.  Eyes: EOMI grossly  Cardiovascular: Intact distal pulses.   Respiratory: Effort normal. No respiratory distress.  Neurologic: Alert and oriented to person, place, and time.  Skin: Skin is warm and dry.  Hematologic / Lymphatic: No lymphedema, lymphangitis.  Psychiatric: normal mood and affect. Behavior is normal.   Musculoskeletal:  Left wrist: 50/50 degrees wrist flexion/extension.  80/80 degrees pronation/supination.  Fullness about ulnar aspect of wrist with some associated tenderness in this region and TFCC.  This fullness does transilluminate.  Tenderness also in region of DRUJ.  No DRUJ instability.  Negative GIOVANA/scaphoid shift.  Sensation intact distally.  Negative Tinel's at level carpal tunnel.  No thenar or intrinsic atrophy.  5/5 thumb abduction/finger abduction.  2+ radial pulse.    Radiographs: Left wrist radiographs ordered and available for my review/interpretation  demonstrate some soft tissue fullness about the ulnar aspect of the wrist.  Ulnar positive variance with degenerative changes about the proximal/ulnar aspect of the lunate and the DRUJ.     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  Given patient's possible cortisone allergy will not consider corticosteroid injection at this time.  Patient will begin bracing as needed symptomatically.  I have sent a prescription for meloxicam into the pharmacy.  If he does remain symptomatic would recommend potential allergy referral for testing for Kenalog.  Surgical intervention is also a possibility if conservative options fail.

## 2025-07-02 ENCOUNTER — APPOINTMENT (OUTPATIENT)
Dept: ORTHOPEDIC SURGERY | Facility: CLINIC | Age: 65
End: 2025-07-02
Payer: COMMERCIAL

## (undated) DEVICE — NEEDLE, SPINAL, QUINCKE, 18 G X 3.5 IN, PINK HUB

## (undated) DEVICE — SYRINGE, 20 CC, LUER LOCK

## (undated) DEVICE — BLADE, SURGICAL, POLYMER COATED P10, STERILE, DISP

## (undated) DEVICE — BOLSTER, HIP

## (undated) DEVICE — SOLUTION, IRRIGATION, X RX SODIUM CHL 0.9%, 1000ML BTL

## (undated) DEVICE — SUTURE, VICRYL, 2-0, 27 IN, CT-1, VIOLET

## (undated) DEVICE — GLOVE, SURGICAL, PROTEXIS PI BLUE W/NEUTHERA, 9.0, PF, LF

## (undated) DEVICE — SEALER, BIPOLAR, AQUA MANTYS 6.0

## (undated) DEVICE — TUBING, IRRIGATION, HIGH FLOW, HAND PIECE SET

## (undated) DEVICE — Device

## (undated) DEVICE — GLOVE, SURGICAL, PROTEXIS PI , 9.0, PF, LF

## (undated) DEVICE — SUTURE, VICRYL, 1, 27 IN, CT-1, VIOLET

## (undated) DEVICE — WOUND SYSTEM, DEBRIDEMENT & CLEANING, O.R DUOPAK